# Patient Record
Sex: MALE | Race: WHITE | NOT HISPANIC OR LATINO | Employment: FULL TIME | URBAN - METROPOLITAN AREA
[De-identification: names, ages, dates, MRNs, and addresses within clinical notes are randomized per-mention and may not be internally consistent; named-entity substitution may affect disease eponyms.]

---

## 2019-10-07 ENCOUNTER — TELEPHONE (OUTPATIENT)
Dept: PULMONOLOGY | Facility: CLINIC | Age: 34
End: 2019-10-07

## 2019-10-07 DIAGNOSIS — R05.9 COUGH: Primary | ICD-10-CM

## 2019-11-21 ENCOUNTER — TELEPHONE (OUTPATIENT)
Dept: PULMONOLOGY | Facility: CLINIC | Age: 34
End: 2019-11-21

## 2020-09-03 DIAGNOSIS — Z00.00 ROUTINE GENERAL MEDICAL EXAMINATION AT A HEALTH CARE FACILITY: Primary | ICD-10-CM

## 2020-09-17 ENCOUNTER — OFFICE VISIT (OUTPATIENT)
Dept: INTERNAL MEDICINE | Facility: CLINIC | Age: 35
End: 2020-09-17
Payer: COMMERCIAL

## 2020-09-17 ENCOUNTER — CLINICAL SUPPORT (OUTPATIENT)
Dept: INTERNAL MEDICINE | Facility: CLINIC | Age: 35
End: 2020-09-17
Payer: COMMERCIAL

## 2020-09-17 ENCOUNTER — HOSPITAL ENCOUNTER (OUTPATIENT)
Dept: RADIOLOGY | Facility: HOSPITAL | Age: 35
Discharge: HOME | End: 2020-09-17
Attending: FAMILY MEDICINE

## 2020-09-17 ENCOUNTER — HOSPITAL ENCOUNTER (OUTPATIENT)
Dept: CARDIOLOGY | Facility: HOSPITAL | Age: 35
Discharge: HOME | End: 2020-09-17
Attending: FAMILY MEDICINE

## 2020-09-17 ENCOUNTER — CLINICAL SUPPORT (OUTPATIENT)
Dept: AUDIOLOGY | Facility: CLINIC | Age: 35
End: 2020-09-17

## 2020-09-17 VITALS
SYSTOLIC BLOOD PRESSURE: 108 MMHG | RESPIRATION RATE: 16 BRPM | HEART RATE: 61 BPM | DIASTOLIC BLOOD PRESSURE: 66 MMHG | HEIGHT: 70 IN | BODY MASS INDEX: 21.14 KG/M2 | WEIGHT: 147.69 LBS

## 2020-09-17 VITALS
HEART RATE: 61 BPM | SYSTOLIC BLOOD PRESSURE: 108 MMHG | DIASTOLIC BLOOD PRESSURE: 66 MMHG | WEIGHT: 147.69 LBS | HEIGHT: 70 IN | BODY MASS INDEX: 21.14 KG/M2 | TEMPERATURE: 95 F | OXYGEN SATURATION: 98 %

## 2020-09-17 DIAGNOSIS — Z01.12 HEARING CONSERVATION AND TREATMENT EXAM: Primary | ICD-10-CM

## 2020-09-17 DIAGNOSIS — Z71.3 DIETARY COUNSELING: ICD-10-CM

## 2020-09-17 DIAGNOSIS — Z23 NEED FOR INFLUENZA VACCINATION: ICD-10-CM

## 2020-09-17 DIAGNOSIS — Z00.00 ROUTINE GENERAL MEDICAL EXAMINATION AT A HEALTH CARE FACILITY: Primary | ICD-10-CM

## 2020-09-17 DIAGNOSIS — Z00.00 PREVENTATIVE HEALTH CARE: Primary | ICD-10-CM

## 2020-09-17 DIAGNOSIS — Z00.00 ROUTINE GENERAL MEDICAL EXAMINATION AT A HEALTH CARE FACILITY: ICD-10-CM

## 2020-09-17 LAB
ALBUMIN SERPL BCP-MCNC: 4.4 G/DL (ref 3.5–5.2)
ALP SERPL-CCNC: 68 U/L (ref 55–135)
ALT SERPL W/O P-5'-P-CCNC: 14 U/L (ref 10–44)
ANION GAP SERPL CALC-SCNC: 7 MMOL/L (ref 8–16)
AST SERPL-CCNC: 19 U/L (ref 10–40)
BILIRUB SERPL-MCNC: 0.7 MG/DL (ref 0.1–1)
BILIRUB UR QL STRIP: NEGATIVE
BUN SERPL-MCNC: 21 MG/DL (ref 6–20)
CALCIUM SERPL-MCNC: 9.3 MG/DL (ref 8.7–10.5)
CHLORIDE SERPL-SCNC: 106 MMOL/L (ref 95–110)
CHOLEST SERPL-MCNC: 158 MG/DL (ref 120–199)
CHOLEST/HDLC SERPL: 3.5 {RATIO} (ref 2–5)
CLARITY UR: CLEAR
CO2 SERPL-SCNC: 29 MMOL/L (ref 23–29)
COLOR UR: YELLOW
CREAT SERPL-MCNC: 1 MG/DL (ref 0.5–1.4)
ERYTHROCYTE [DISTWIDTH] IN BLOOD BY AUTOMATED COUNT: 11.2 % (ref 11.5–14.5)
EST. GFR  (AFRICAN AMERICAN): >60 ML/MIN/1.73 M^2
EST. GFR  (NON AFRICAN AMERICAN): >60 ML/MIN/1.73 M^2
ESTIMATED AVG GLUCOSE: 100 MG/DL (ref 68–131)
GLUCOSE SERPL-MCNC: 89 MG/DL (ref 70–110)
GLUCOSE UR QL STRIP: NEGATIVE
HBA1C MFR BLD HPLC: 5.1 % (ref 4–5.6)
HCT VFR BLD AUTO: 39.6 % (ref 40–54)
HDLC SERPL-MCNC: 45 MG/DL (ref 40–75)
HDLC SERPL: 28.5 % (ref 20–50)
HGB BLD-MCNC: 13.6 G/DL (ref 14–18)
HGB UR QL STRIP: NEGATIVE
KETONES UR QL STRIP: NEGATIVE
LDLC SERPL CALC-MCNC: 101.6 MG/DL (ref 63–159)
LEUKOCYTE ESTERASE UR QL STRIP: NEGATIVE
MCH RBC QN AUTO: 31.3 PG (ref 27–31)
MCHC RBC AUTO-ENTMCNC: 34.3 G/DL (ref 32–36)
MCV RBC AUTO: 91 FL (ref 82–98)
NITRITE UR QL STRIP: NEGATIVE
NONHDLC SERPL-MCNC: 113 MG/DL
PH UR STRIP: 7 [PH] (ref 5–8)
PLATELET # BLD AUTO: 192 K/UL (ref 150–350)
PMV BLD AUTO: 8.8 FL (ref 9.2–12.9)
POTASSIUM SERPL-SCNC: 4.5 MMOL/L (ref 3.5–5.1)
PROT SERPL-MCNC: 7.4 G/DL (ref 6–8.4)
PROT UR QL STRIP: NEGATIVE
RBC # BLD AUTO: 4.34 M/UL (ref 4.6–6.2)
SODIUM SERPL-SCNC: 142 MMOL/L (ref 136–145)
SP GR UR STRIP: 1.01 (ref 1–1.03)
TRIGL SERPL-MCNC: 57 MG/DL (ref 30–150)
URN SPEC COLLECT METH UR: NORMAL
WBC # BLD AUTO: 4.98 K/UL (ref 3.9–12.7)

## 2020-09-17 PROCEDURE — 93005 ELECTROCARDIOGRAM TRACING: CPT

## 2020-09-17 PROCEDURE — 99999 PR PBB SHADOW E&M-EST. PATIENT-LVL III: ICD-10-PCS | Mod: PBBFAC,,, | Performed by: FAMILY MEDICINE

## 2020-09-17 PROCEDURE — 80053 COMPREHEN METABOLIC PANEL: CPT

## 2020-09-17 PROCEDURE — 83036 HEMOGLOBIN GLYCOSYLATED A1C: CPT

## 2020-09-17 PROCEDURE — 80061 LIPID PANEL: CPT

## 2020-09-17 PROCEDURE — 90471 FLU VACCINE (QUAD) GREATER THAN OR EQUAL TO 3YO PRESERVATIVE FREE IM: ICD-10-PCS | Mod: S$GLB,,, | Performed by: FAMILY MEDICINE

## 2020-09-17 PROCEDURE — 90686 FLU VACCINE (QUAD) GREATER THAN OR EQUAL TO 3YO PRESERVATIVE FREE IM: ICD-10-PCS | Mod: S$GLB,,, | Performed by: FAMILY MEDICINE

## 2020-09-17 PROCEDURE — 90471 IMMUNIZATION ADMIN: CPT | Mod: S$GLB,,, | Performed by: FAMILY MEDICINE

## 2020-09-17 PROCEDURE — 71046 X-RAY EXAM CHEST 2 VIEWS: CPT | Mod: TC

## 2020-09-17 PROCEDURE — 86703 HIV-1/HIV-2 1 RESULT ANTBDY: CPT

## 2020-09-17 PROCEDURE — 92552 PURE TONE AUDIOMETRY AIR: CPT | Mod: S$GLB,,, | Performed by: AUDIOLOGIST

## 2020-09-17 PROCEDURE — 90686 IIV4 VACC NO PRSV 0.5 ML IM: CPT | Mod: S$GLB,,, | Performed by: FAMILY MEDICINE

## 2020-09-17 PROCEDURE — 99385 PREV VISIT NEW AGE 18-39: CPT | Mod: 25,S$GLB,, | Performed by: FAMILY MEDICINE

## 2020-09-17 PROCEDURE — 99999 PR PBB SHADOW E&M-EST. PATIENT-LVL III: CPT | Mod: PBBFAC,,, | Performed by: FAMILY MEDICINE

## 2020-09-17 PROCEDURE — 97802 PR MED NUTR THER, 1ST, INDIV, EA 15 MIN: ICD-10-PCS | Mod: S$GLB,,, | Performed by: INTERNAL MEDICINE

## 2020-09-17 PROCEDURE — 71046 XR CHEST PA AND LATERAL: ICD-10-PCS | Mod: 26,,, | Performed by: RADIOLOGY

## 2020-09-17 PROCEDURE — 83655 ASSAY OF LEAD: CPT

## 2020-09-17 PROCEDURE — 85027 COMPLETE CBC AUTOMATED: CPT

## 2020-09-17 PROCEDURE — 97802 MEDICAL NUTRITION INDIV IN: CPT | Mod: S$GLB,,, | Performed by: INTERNAL MEDICINE

## 2020-09-17 PROCEDURE — 93010 EKG 12-LEAD: ICD-10-PCS | Mod: ,,, | Performed by: INTERNAL MEDICINE

## 2020-09-17 PROCEDURE — 81003 URINALYSIS AUTO W/O SCOPE: CPT

## 2020-09-17 PROCEDURE — 99385 PR PREVENTIVE VISIT,NEW,18-39: ICD-10-PCS | Mod: 25,S$GLB,, | Performed by: FAMILY MEDICINE

## 2020-09-17 PROCEDURE — 92552 PR PURE TONE AUDIOMETRY, AIR: ICD-10-PCS | Mod: S$GLB,,, | Performed by: AUDIOLOGIST

## 2020-09-17 PROCEDURE — 71046 X-RAY EXAM CHEST 2 VIEWS: CPT | Mod: 26,,, | Performed by: RADIOLOGY

## 2020-09-17 PROCEDURE — 93010 ELECTROCARDIOGRAM REPORT: CPT | Mod: ,,, | Performed by: INTERNAL MEDICINE

## 2020-09-17 RX ORDER — HYDROGEN PEROXIDE 3 %
40 SOLUTION, NON-ORAL MISCELLANEOUS
COMMUNITY
End: 2020-09-17

## 2020-09-17 NOTE — PROGRESS NOTES
"Nutrition Assessment  Client name:  Fco Millan  :  1985  Age:  35 y.o.  Gender:  male    Client states:  Very pleasant employee from [employer] here for his first annual wellness exam with [employer]. Today is his first appointment with a dietitian. Patient reports being on the thinner side his whole life. Patient wonders if he is considered a healthy weight or should he try to gain more. Attempts at weight gain in the past has led to patient gaining mainly belly fat and reports having a "soft" belly which he does not like. Patient asked question in regards to if he is consuming enough food on a daily basis. Does not keep a food diary to track his daily caloric intake, but estimates to consume approximately 400-500 calories per meal time and less for each snack. Told by physician he is slightly anemic and was recommended to take a daily iron supplement, but never did. Food and exercise history provided below.                                   Anthropometrics  Height:  5' 10"     Weight:  147 lbs  BMI:  21.19  % Body Fat:  n/a    Clinical Signs/Symptoms  N/V/D:  none  Appetite (Good, Fair, or Poor):  good      No past medical history on file.    Past Surgical History:   Procedure Laterality Date    APPENDECTOMY         Medications    currently has no medications in their medication list.    Vitamins, Minerals, and/or Supplements:  probiotic     Food/Medication Interactions:  Reviewed     Food Allergies or Intolerances:  NKFA     Social History    Marital status:    Employment:  [employer]    Social History     Tobacco Use    Smoking status: Never Smoker   Substance Use Topics    Alcohol use: No     Alcohol/week: 0.0 standard drinks        Lab Reports   Total Cholesterol:  158    Triglycerides:  57  HDL:  45  LDL:  101.6   Glucose:  89  HbA1c:  pending  BP:  108/66     Food History  Breakfast:  Eggs + oatmeal  Mid-morning Snack:  (second breakfast at station) eggs + grits + " sausage  Specific for other meals not provided. Some foods patient enjoys are: wheat bread, 4-6oz steaks, pizza, chicken. Typically eats 5 times daily (meals and snacks).  *Fluid intake:  Water, 1-2 coffee (black), Ensure, small can of Coke Zero    Exercise History:  Run/walk/bike 3-4x/week for 20 minute sessions, little weight lifting    Cultural/Spiritual/Personal Preferences:  None noted    Support System:  spouse    State of Change:  contemplation    Barriers to Change:  none    Diagnosis    Food- and nutrition-knowledge deficit related to lack of prior nutrition education as evidenced by patient questioning healthy weight and appropriate daily caloric intake.    Intervention    RMR (Method:  [place] [employer]):  1607 kcal  Activity Factor:  1.3  RAÚL:  2089 calories    Goals:  1.  Keep a food diary for 3 days to get a general idea of daily caloric intake.  2.  Aim to consume approximately 2100 calories daily.  3.  Follow My Plate Method guidelines for balanced meal options.    Nutrition Education  Lipid panel and glucose labs were available at time of nutrition consultation; labs were already reviewed with patient by physician. Due to patient's anemia, encouraged patient to take iron supplement as recommended by physician. Made patient aware of some high iron foods to include in his daily diet. Provided patient daily caloric needs of approximately 2100 calories for weight maintenance given there will be no change in exercise routine. If patient is currently not meeting daily needs, gave recommendations of calorie dense foods he can begin to include in his daily diet. Reviewed My Plate Method. Provided contact information.    Patient verbalized understanding of nutrition education and recommendations received.    Handouts Provided  Meal Planning Guide  Restaurant Guide  Eat Fit Shopping List  Eat Fit Amanda  Fast Food Guide  My Plate Method  Healthy Snack List    Monitoring/Evaluation    Monitor the  following:  Weight  BMI  Caloric intake  Labs:  Lipid Panel, Glucose, HgbA1c    Follow Up Plan:  Communication with referring healthcare provider is unnecessary at this time as patient presented as part of annual wellness exam.  However, will follow up with patient in 1-2 years.

## 2020-09-17 NOTE — PROGRESS NOTES
Executive Health Screening    Fco Millan was seen 09/17/2020 for an executive health hearing screen.  Results revealed normal hearing sensitivity 250-8000 Hz, bilaterally, with the exception of a mild hearing loss at 3kHz, As.  Otoscopy was within normal limits, bilaterally.    Recommendations:  1. Wear Hearing Protective Devices around loud noises

## 2020-09-17 NOTE — PROGRESS NOTES
Dear Fco,    Thank you for allowing me to care for you and perform your Executive Health exam on 09/17/2020.    This letter and the accompanying report will serve as a summary of the medical history you provided, your physical exam findings, and your test results.    Thanks for letting me care for you, and thanks for trusting [facility] with your healthcare needs.    I look forward to the next time I can serve you. Until then, take care, and be well.    Warmest regards,     OLGA Zamudio MD    EXECUTIVE HEALTH ENCOUNTER      REASON FOR VISIT  EXECUTIVE HEALTH    HEALTH MAINTENANCE INTERVENTIONS - UP TO DATE  Health Maintenance Topics with due status: Not Due       Topic Last Completion Date    TETANUS VACCINE 01/01/2017    Lipid Panel 09/17/2020       HEALTH MAINTENANCE INTERVENTIONS - DUE OR DUE SOON  Health Maintenance Due   Topic Date Due    Hepatitis C Screening  1985       PCP (Primary Care Provider)  Fco is requesting that I take over as his primary care provider.    ISSUES  The following issues were identified and addressed.  1. Preventative health care        Problem List Items Addressed This Visit     None      Visit Diagnoses     Preventative health care    -  Primary          DATA SUMMARY    LABORATORY:    CBC: The complete blood count (CBC) checks for anemia and measures the red blood cells, white blood cells, and platelets in your blood.  o YOUR RESULTS: Your CBC shows a very mild anemia. Although not completely normal, your result does not necessarily mean something bad. I recommend trying to get more iron in your diet. Foods high in iron include breakfast cereals enriched with iron, cooked beans, tofu, pumpkin seeds, sesame seeds, or squash seeds, lima beans, red kidney beans, chickpeas, dried apricots, baked potato, and wheat germ. Animal products can also be a good source of iron, but these are also high in fat and can be unhealthy for your heart. Animal products high in iron include  beef or chicken liver, clams, mollusks, or mussels, oysters, cooked beef, and sardines (canned in oil).     CMP: The comprehensive metabolic panel (CMP) checks kidney function, liver function, sodium, potassium, glucose (sugar) and other aspects of your metabolism.  o YOUR RESULTS: NORMAL or at least ACCEPTABLE. No further evaluation or treatment required at this time.     Lipid Panel: The lipid panel measures the levels of different types of fatty substances in your blood (cholesterol and triglycerides) that can contribute to plaque buildup in your arteries (atherosclerosis).  o YOUR RESULTS: NORMAL. No further evaluation or treatment required.     A1c: The hemoglobin A1c (A1c) is a test that evaluates and screens for diabetes.  o YOUR RESULTS: NORMAL. No further evaluation or treatment required.     HIV: This test screens for infection with HIV (Human Immunodeficiency Virus).  o YOUR RESULTS: NORMAL. No further evaluation or treatment required.     Urinalysis: A test that examines urine. The test can be chemical, microscopic, or both.  o YOUR RESULTS: NORMAL. No further evaluation or treatment required.     Serum Lead Level:  A measure of the amount of lead (a toxic heavy metal) in your blood.  o YOUR RESULTS: NORMAL. No further evaluation or treatment required.      CARDIOPULMONARY:   Electrocardiogram: The electrocardiogram (also referred to as ECG or EKG) is a non-invasive test that measures the electrical activity of the heart's conduction system.  o YOUR RESULTS: NORMAL. No further evaluation or treatment required.     Chest X-ray: This test produces images that allow simple examination of the heart and lungs.  o YOUR RESULTS: NORMAL. No further evaluation or treatment required.      OTHER:     Audiometry: Audiometry is a test that measures a person's ability to hear at various sound frequencies.  o YOUR RESULTS: Results revealed normal hearing sensitivity 250-8000 Hz, bilaterally, with the exception  "of a mild hearing loss at 3kHz, As.  Otoscopy was within normal limits, bilaterally. Recommendations: Wear Hearing Protective Devices around loud noises.      PHYSICAL EXAM  His body mass index is 21.19 kg/m². His  height is 5' 10" (1.778 m) and weight is 67 kg (147 lb 11.3 oz). His tympanic temperature is 94.8 °F (34.9 °C) (abnormal). His blood pressure is 108/66 and his pulse is 61. His oxygen saturation is 98%.    Physical Exam  Vitals signs reviewed.   Constitutional:       General: He is not in acute distress.     Appearance: Normal appearance.   Eyes:      General: No scleral icterus.     Conjunctiva/sclera: Conjunctivae normal.   Neck:      Musculoskeletal: No muscular tenderness.      Vascular: No carotid bruit.   Cardiovascular:      Rate and Rhythm: Normal rate and regular rhythm.      Heart sounds: Normal heart sounds.   Pulmonary:      Effort: Pulmonary effort is normal. No respiratory distress.      Breath sounds: Normal breath sounds. No wheezing or rhonchi.   Abdominal:      General: Bowel sounds are normal. There is no distension.      Palpations: Abdomen is soft. There is no mass.      Tenderness: There is no abdominal tenderness.   Lymphadenopathy:      Cervical: No cervical adenopathy.   Skin:     General: Skin is warm and dry.      Coloration: Skin is not jaundiced.   Neurological:      General: No focal deficit present.      Mental Status: He is alert and oriented to person, place, and time.   Psychiatric:         Mood and Affect: Mood normal.         Behavior: Behavior normal.         Judgment: Judgment normal.         MEDICATIONS  He currently has no medications in their medication list.  Medications Discontinued During This Encounter   Medication Reason    cetirizine (ZYRTEC) 10 MG tablet Patient no longer taking    esomeprazole (NEXIUM) 20 MG capsule Patient no longer taking    fluticasone (FLONASE) 50 mcg/actuation nasal spray Patient no longer taking          REVIEW OF SYSTEMS  Review " "of Systems   Constitutional: Negative for chills and fever.   HENT: Negative for ear pain and trouble swallowing.    Eyes: Negative for pain and visual disturbance.   Respiratory: Negative for chest tightness and shortness of breath.    Cardiovascular: Negative for chest pain and palpitations.   Gastrointestinal: Negative for abdominal pain and blood in stool.   Endocrine: Negative for polydipsia and polyuria.   Genitourinary: Negative.  Negative for difficulty urinating, dysuria and hematuria.   Musculoskeletal: Negative for gait problem and joint swelling.   Integumentary:  Negative for rash and wound.   Neurological: Negative for vertigo and syncope.   Hematological: Negative.    Psychiatric/Behavioral: Negative for agitation and confusion.       FOLLOW-UP  He is to follow up with me for any "Health Maintenance Interventions - Due Or Due Soon" listed above, for any unresolved issues addressed during this encounter, and for any new complaints or concerns.    PAST MEDICAL HISTORY  He has no past medical history on file.    SURGICAL HISTORY  He has a past surgical history that includes Appendectomy.    FAMILY HISTORY  His family history includes No Known Problems in his father and mother.     ALLERGIES  He has No Known Allergies.    SOCIAL HISTORY   TOBACCO USE HISTORY: He reports that he has never smoked. He does not have any smokeless tobacco history on file.   ALCOHOL USE HISTORY:  He reports no history of alcohol use.    Documentation entered by me for this encounter may have been done in part using speech-recognition technology. Although I have made an effort to ensure accuracy, "sound like" errors may exist and should be interpreted in context. -OLGA Zamudio MD.   "

## 2020-09-18 ENCOUNTER — PATIENT MESSAGE (OUTPATIENT)
Dept: INTERNAL MEDICINE | Facility: CLINIC | Age: 35
End: 2020-09-18

## 2020-09-18 LAB
HIV 1+2 AB+HIV1 P24 AG SERPL QL IA: NEGATIVE
LEAD BLD-MCNC: <1 MCG/DL
STATE OF RESIDENCE: NORMAL

## 2020-09-19 ENCOUNTER — PATIENT MESSAGE (OUTPATIENT)
Dept: INTERNAL MEDICINE | Facility: CLINIC | Age: 35
End: 2020-09-19

## 2021-01-05 ENCOUNTER — PATIENT MESSAGE (OUTPATIENT)
Dept: INTERNAL MEDICINE | Facility: CLINIC | Age: 36
End: 2021-01-05

## 2021-01-22 ENCOUNTER — CLINICAL SUPPORT (OUTPATIENT)
Dept: OTHER | Facility: CLINIC | Age: 36
End: 2021-01-22

## 2021-01-22 DIAGNOSIS — Z00.8 ENCOUNTER FOR OTHER GENERAL EXAMINATION: ICD-10-CM

## 2021-01-28 ENCOUNTER — PATIENT MESSAGE (OUTPATIENT)
Dept: INTERNAL MEDICINE | Facility: CLINIC | Age: 36
End: 2021-01-28

## 2021-02-22 ENCOUNTER — PATIENT MESSAGE (OUTPATIENT)
Dept: INTERNAL MEDICINE | Facility: CLINIC | Age: 36
End: 2021-02-22

## 2021-03-11 ENCOUNTER — HOSPITAL ENCOUNTER (OUTPATIENT)
Dept: RADIOLOGY | Facility: HOSPITAL | Age: 36
Discharge: HOME | End: 2021-03-11
Attending: FAMILY MEDICINE
Payer: OTHER GOVERNMENT

## 2021-03-11 ENCOUNTER — OFFICE VISIT (OUTPATIENT)
Dept: INTERNAL MEDICINE | Facility: CLINIC | Age: 36
End: 2021-03-11
Payer: OTHER GOVERNMENT

## 2021-03-11 VITALS
WEIGHT: 151.25 LBS | SYSTOLIC BLOOD PRESSURE: 120 MMHG | OXYGEN SATURATION: 98 % | HEART RATE: 87 BPM | DIASTOLIC BLOOD PRESSURE: 86 MMHG | TEMPERATURE: 98 F | BODY MASS INDEX: 21.7 KG/M2

## 2021-03-11 DIAGNOSIS — Z83.79 FAMILY HISTORY OF BARRETT'S ESOPHAGUS: ICD-10-CM

## 2021-03-11 DIAGNOSIS — M79.672 CHRONIC PAIN IN LEFT FOOT: ICD-10-CM

## 2021-03-11 DIAGNOSIS — G89.29 CHRONIC PAIN IN LEFT FOOT: ICD-10-CM

## 2021-03-11 DIAGNOSIS — R39.11 URINARY HESITANCY: ICD-10-CM

## 2021-03-11 DIAGNOSIS — K21.9 GASTROESOPHAGEAL REFLUX DISEASE WITHOUT ESOPHAGITIS: Primary | ICD-10-CM

## 2021-03-11 DIAGNOSIS — M75.102 ROTATOR CUFF SYNDROME OF LEFT SHOULDER: ICD-10-CM

## 2021-03-11 DIAGNOSIS — I49.3 FREQUENT PVCS: ICD-10-CM

## 2021-03-11 PROBLEM — K21.00 GASTROESOPHAGEAL REFLUX DISEASE WITH ESOPHAGITIS WITHOUT HEMORRHAGE: Status: ACTIVE | Noted: 2021-03-11

## 2021-03-11 PROCEDURE — 99214 PR OFFICE/OUTPT VISIT, EST, LEVL IV, 30-39 MIN: ICD-10-PCS | Mod: S$PBB,,, | Performed by: FAMILY MEDICINE

## 2021-03-11 PROCEDURE — 73630 X-RAY EXAM OF FOOT: CPT | Mod: TC,LT

## 2021-03-11 PROCEDURE — 99214 OFFICE O/P EST MOD 30 MIN: CPT | Mod: S$PBB,,, | Performed by: FAMILY MEDICINE

## 2021-03-11 PROCEDURE — 99999 PR PBB SHADOW E&M-EST. PATIENT-LVL V: CPT | Mod: PBBFAC,,, | Performed by: FAMILY MEDICINE

## 2021-03-11 PROCEDURE — 73630 XR FOOT COMPLETE 3 VIEW LEFT: ICD-10-PCS | Mod: 26,LT,, | Performed by: RADIOLOGY

## 2021-03-11 PROCEDURE — 99215 OFFICE O/P EST HI 40 MIN: CPT | Mod: PBBFAC,25 | Performed by: FAMILY MEDICINE

## 2021-03-11 PROCEDURE — 73630 X-RAY EXAM OF FOOT: CPT | Mod: 26,LT,, | Performed by: RADIOLOGY

## 2021-03-11 PROCEDURE — 99999 PR PBB SHADOW E&M-EST. PATIENT-LVL V: ICD-10-PCS | Mod: PBBFAC,,, | Performed by: FAMILY MEDICINE

## 2021-03-14 ENCOUNTER — PATIENT MESSAGE (OUTPATIENT)
Dept: INTERNAL MEDICINE | Facility: CLINIC | Age: 36
End: 2021-03-14

## 2021-03-14 DIAGNOSIS — I49.3 FREQUENT PVCS: Primary | ICD-10-CM

## 2021-03-15 ENCOUNTER — TELEPHONE (OUTPATIENT)
Dept: ENDOSCOPY | Facility: HOSPITAL | Age: 36
End: 2021-03-15

## 2021-03-22 ENCOUNTER — CLINICAL SUPPORT (OUTPATIENT)
Dept: REHABILITATION | Facility: HOSPITAL | Age: 36
End: 2021-03-22
Attending: FAMILY MEDICINE
Payer: OTHER GOVERNMENT

## 2021-03-22 DIAGNOSIS — G89.29 CHRONIC LEFT SHOULDER PAIN: ICD-10-CM

## 2021-03-22 DIAGNOSIS — M75.102 ROTATOR CUFF SYNDROME OF LEFT SHOULDER: ICD-10-CM

## 2021-03-22 DIAGNOSIS — M25.512 CHRONIC LEFT SHOULDER PAIN: ICD-10-CM

## 2021-03-22 DIAGNOSIS — R53.1 GENERALIZED WEAKNESS: ICD-10-CM

## 2021-03-22 DIAGNOSIS — M25.60 DECREASED RANGE OF MOTION: ICD-10-CM

## 2021-03-22 PROCEDURE — 97161 PT EVAL LOW COMPLEX 20 MIN: CPT | Mod: PN

## 2021-03-22 PROCEDURE — 97110 THERAPEUTIC EXERCISES: CPT | Mod: PN

## 2021-03-24 ENCOUNTER — OFFICE VISIT (OUTPATIENT)
Dept: PODIATRY | Facility: CLINIC | Age: 36
End: 2021-03-24
Payer: OTHER GOVERNMENT

## 2021-03-24 VITALS
WEIGHT: 151 LBS | HEIGHT: 70 IN | HEART RATE: 66 BPM | SYSTOLIC BLOOD PRESSURE: 100 MMHG | DIASTOLIC BLOOD PRESSURE: 66 MMHG | BODY MASS INDEX: 21.62 KG/M2

## 2021-03-24 DIAGNOSIS — M12.572 TRAUMATIC ARTHRITIS OF FOOT, LEFT: Primary | ICD-10-CM

## 2021-03-24 PROCEDURE — 99999 PR PBB SHADOW E&M-EST. PATIENT-LVL III: ICD-10-PCS | Mod: PBBFAC,,, | Performed by: PODIATRIST

## 2021-03-24 PROCEDURE — 99243 PR OFFICE CONSULTATION,LEVEL III: ICD-10-PCS | Mod: S$PBB,,, | Performed by: PODIATRIST

## 2021-03-24 PROCEDURE — 99999 PR PBB SHADOW E&M-EST. PATIENT-LVL III: CPT | Mod: PBBFAC,,, | Performed by: PODIATRIST

## 2021-03-24 PROCEDURE — 99213 OFFICE O/P EST LOW 20 MIN: CPT | Mod: PBBFAC | Performed by: PODIATRIST

## 2021-03-24 PROCEDURE — 99243 OFF/OP CNSLTJ NEW/EST LOW 30: CPT | Mod: S$PBB,,, | Performed by: PODIATRIST

## 2021-03-25 ENCOUNTER — OFFICE VISIT (OUTPATIENT)
Dept: UROLOGY | Facility: CLINIC | Age: 36
End: 2021-03-25
Payer: OTHER GOVERNMENT

## 2021-03-25 VITALS
HEIGHT: 70 IN | DIASTOLIC BLOOD PRESSURE: 86 MMHG | WEIGHT: 151 LBS | HEART RATE: 80 BPM | SYSTOLIC BLOOD PRESSURE: 118 MMHG | BODY MASS INDEX: 21.62 KG/M2

## 2021-03-25 DIAGNOSIS — R39.11 URINARY HESITANCY: ICD-10-CM

## 2021-03-25 PROCEDURE — 99203 PR OFFICE/OUTPT VISIT, NEW, LEVL III, 30-44 MIN: ICD-10-PCS | Mod: S$PBB,,, | Performed by: UROLOGY

## 2021-03-25 PROCEDURE — 99999 PR PBB SHADOW E&M-EST. PATIENT-LVL III: ICD-10-PCS | Mod: PBBFAC,,, | Performed by: UROLOGY

## 2021-03-25 PROCEDURE — 99999 PR PBB SHADOW E&M-EST. PATIENT-LVL III: CPT | Mod: PBBFAC,,, | Performed by: UROLOGY

## 2021-03-25 PROCEDURE — 99213 OFFICE O/P EST LOW 20 MIN: CPT | Mod: PBBFAC | Performed by: UROLOGY

## 2021-03-25 PROCEDURE — 99203 OFFICE O/P NEW LOW 30 MIN: CPT | Mod: S$PBB,,, | Performed by: UROLOGY

## 2021-03-29 ENCOUNTER — CLINICAL SUPPORT (OUTPATIENT)
Dept: REHABILITATION | Facility: HOSPITAL | Age: 36
End: 2021-03-29
Attending: FAMILY MEDICINE
Payer: OTHER GOVERNMENT

## 2021-03-29 DIAGNOSIS — R53.1 GENERALIZED WEAKNESS: ICD-10-CM

## 2021-03-29 DIAGNOSIS — G89.29 CHRONIC LEFT SHOULDER PAIN: ICD-10-CM

## 2021-03-29 DIAGNOSIS — M25.512 CHRONIC LEFT SHOULDER PAIN: ICD-10-CM

## 2021-03-29 DIAGNOSIS — M25.60 DECREASED RANGE OF MOTION: ICD-10-CM

## 2021-03-29 PROCEDURE — 97140 MANUAL THERAPY 1/> REGIONS: CPT | Mod: PN

## 2021-03-29 PROCEDURE — 97110 THERAPEUTIC EXERCISES: CPT | Mod: PN

## 2021-04-01 ENCOUNTER — CLINICAL SUPPORT (OUTPATIENT)
Dept: REHABILITATION | Facility: HOSPITAL | Age: 36
End: 2021-04-01
Attending: FAMILY MEDICINE
Payer: OTHER GOVERNMENT

## 2021-04-01 DIAGNOSIS — G89.29 CHRONIC LEFT SHOULDER PAIN: ICD-10-CM

## 2021-04-01 DIAGNOSIS — M25.60 DECREASED RANGE OF MOTION: ICD-10-CM

## 2021-04-01 DIAGNOSIS — R53.1 GENERALIZED WEAKNESS: ICD-10-CM

## 2021-04-01 DIAGNOSIS — M25.512 CHRONIC LEFT SHOULDER PAIN: ICD-10-CM

## 2021-04-01 PROCEDURE — 97140 MANUAL THERAPY 1/> REGIONS: CPT | Mod: PN,CQ

## 2021-04-01 PROCEDURE — 97110 THERAPEUTIC EXERCISES: CPT | Mod: PN,CQ

## 2021-04-05 ENCOUNTER — CLINICAL SUPPORT (OUTPATIENT)
Dept: REHABILITATION | Facility: HOSPITAL | Age: 36
End: 2021-04-05
Attending: FAMILY MEDICINE
Payer: OTHER GOVERNMENT

## 2021-04-05 DIAGNOSIS — M25.512 CHRONIC LEFT SHOULDER PAIN: ICD-10-CM

## 2021-04-05 DIAGNOSIS — G89.29 CHRONIC LEFT SHOULDER PAIN: ICD-10-CM

## 2021-04-05 DIAGNOSIS — R53.1 GENERALIZED WEAKNESS: ICD-10-CM

## 2021-04-05 DIAGNOSIS — M25.60 DECREASED RANGE OF MOTION: ICD-10-CM

## 2021-04-05 PROCEDURE — 97110 THERAPEUTIC EXERCISES: CPT | Mod: PN,CQ

## 2021-04-05 PROCEDURE — 97140 MANUAL THERAPY 1/> REGIONS: CPT | Mod: PN,CQ

## 2021-04-07 ENCOUNTER — CLINICAL SUPPORT (OUTPATIENT)
Dept: REHABILITATION | Facility: HOSPITAL | Age: 36
End: 2021-04-07
Attending: FAMILY MEDICINE
Payer: OTHER GOVERNMENT

## 2021-04-07 DIAGNOSIS — M25.60 DECREASED RANGE OF MOTION: ICD-10-CM

## 2021-04-07 DIAGNOSIS — R53.1 GENERALIZED WEAKNESS: ICD-10-CM

## 2021-04-07 DIAGNOSIS — M25.512 CHRONIC LEFT SHOULDER PAIN: ICD-10-CM

## 2021-04-07 DIAGNOSIS — G89.29 CHRONIC LEFT SHOULDER PAIN: ICD-10-CM

## 2021-04-07 PROCEDURE — 97140 MANUAL THERAPY 1/> REGIONS: CPT | Mod: PN,CQ

## 2021-04-07 PROCEDURE — 97110 THERAPEUTIC EXERCISES: CPT | Mod: PN,CQ

## 2021-04-12 ENCOUNTER — PATIENT MESSAGE (OUTPATIENT)
Dept: INTERNAL MEDICINE | Facility: CLINIC | Age: 36
End: 2021-04-12

## 2021-04-12 ENCOUNTER — CLINICAL SUPPORT (OUTPATIENT)
Dept: REHABILITATION | Facility: HOSPITAL | Age: 36
End: 2021-04-12
Attending: FAMILY MEDICINE
Payer: OTHER GOVERNMENT

## 2021-04-12 DIAGNOSIS — R53.1 GENERALIZED WEAKNESS: ICD-10-CM

## 2021-04-12 DIAGNOSIS — M25.60 DECREASED RANGE OF MOTION: ICD-10-CM

## 2021-04-12 DIAGNOSIS — G89.29 CHRONIC LEFT SHOULDER PAIN: ICD-10-CM

## 2021-04-12 DIAGNOSIS — M25.511 ACUTE PAIN OF RIGHT SHOULDER: ICD-10-CM

## 2021-04-12 DIAGNOSIS — M75.102 ROTATOR CUFF SYNDROME OF LEFT SHOULDER: Primary | ICD-10-CM

## 2021-04-12 DIAGNOSIS — M25.512 CHRONIC LEFT SHOULDER PAIN: ICD-10-CM

## 2021-04-12 PROCEDURE — 97140 MANUAL THERAPY 1/> REGIONS: CPT | Mod: PN

## 2021-04-12 PROCEDURE — 97110 THERAPEUTIC EXERCISES: CPT | Mod: PN

## 2021-04-13 ENCOUNTER — TELEPHONE (OUTPATIENT)
Dept: INTERNAL MEDICINE | Facility: CLINIC | Age: 36
End: 2021-04-13

## 2021-04-13 NOTE — TELEPHONE ENCOUNTER
Fco Bobby.    I received a status report from your physical therapist, Becky Haynes PT. I understand that your shoulder it not improving as desired.    I have entered a referral order for you to be treated by one of our excellent orthopedic surgeons (a specialist that diagnoses and treats diseases, injuries, and defects of bones and joints). Someone from [facility] will be contacting you soon to help you schedule that appointment and a shoulder x-ray to be done before the orthopedic appointment.    Please take the time soon to schedule that appointment. You can schedule the appointment from your [facility] account or from the [facility]athish on your smartphone or by calling our appointment desk at [phone]. If you have any difficulty, send my staff a message, and they will be glad to help.     Thanks for letting me care for you, and thanks for trusting [facility] with your healthcare needs.    Take care and be well.    Sincerely,    OLGA Zamudio MD

## 2021-04-13 NOTE — TELEPHONE ENCOUNTER
----- Message from Becky Haynes PT sent at 4/12/2021 12:51 PM CDT -----  Hi Dr. Zamudio, I have been seeing this patient for L shoulder pain. He has not really been able to progress very well and I am beginning to be a little concerned for RTC involvement. Initially on his evaluation he was negative for testing for the RTC, however, I am beginning to think that he may have been compensating. The pt is concerned because he is supposed to be leaving for training in a few months and his shoulder is not getting any better. I didn't know if you felt that at this time it might be time to try an ortho referral or to try to obtain a MRI for further imaging of the shoulder. Thank you again for the referral and I appreciate the interdisciplinary team work.     Have a great day, Becky Haynes, PT, DPT

## 2021-04-14 ENCOUNTER — CLINICAL SUPPORT (OUTPATIENT)
Dept: REHABILITATION | Facility: HOSPITAL | Age: 36
End: 2021-04-14
Attending: FAMILY MEDICINE
Payer: OTHER GOVERNMENT

## 2021-04-14 DIAGNOSIS — M25.60 DECREASED RANGE OF MOTION: ICD-10-CM

## 2021-04-14 DIAGNOSIS — G89.29 CHRONIC LEFT SHOULDER PAIN: ICD-10-CM

## 2021-04-14 DIAGNOSIS — R53.1 GENERALIZED WEAKNESS: ICD-10-CM

## 2021-04-14 DIAGNOSIS — M25.512 CHRONIC LEFT SHOULDER PAIN: ICD-10-CM

## 2021-04-14 PROCEDURE — 97110 THERAPEUTIC EXERCISES: CPT | Mod: PN,CQ

## 2021-04-14 PROCEDURE — 97140 MANUAL THERAPY 1/> REGIONS: CPT | Mod: PN,CQ

## 2021-04-15 ENCOUNTER — PATIENT MESSAGE (OUTPATIENT)
Dept: INTERNAL MEDICINE | Facility: CLINIC | Age: 36
End: 2021-04-15

## 2021-04-15 ENCOUNTER — TELEPHONE (OUTPATIENT)
Dept: ORTHOPEDICS | Facility: CLINIC | Age: 36
End: 2021-04-15

## 2021-04-15 DIAGNOSIS — M25.512 BILATERAL SHOULDER PAIN, UNSPECIFIED CHRONICITY: Primary | ICD-10-CM

## 2021-04-15 DIAGNOSIS — M25.511 BILATERAL SHOULDER PAIN, UNSPECIFIED CHRONICITY: Primary | ICD-10-CM

## 2021-04-16 ENCOUNTER — HOSPITAL ENCOUNTER (OUTPATIENT)
Dept: RADIOLOGY | Facility: HOSPITAL | Age: 36
Discharge: HOME | End: 2021-04-16
Attending: FAMILY MEDICINE
Payer: OTHER GOVERNMENT

## 2021-04-16 ENCOUNTER — OFFICE VISIT (OUTPATIENT)
Dept: ORTHOPEDICS | Facility: CLINIC | Age: 36
End: 2021-04-16
Payer: OTHER GOVERNMENT

## 2021-04-16 VITALS
HEART RATE: 82 BPM | DIASTOLIC BLOOD PRESSURE: 79 MMHG | SYSTOLIC BLOOD PRESSURE: 123 MMHG | WEIGHT: 151 LBS | HEIGHT: 70 IN | BODY MASS INDEX: 21.62 KG/M2

## 2021-04-16 DIAGNOSIS — M25.511 BILATERAL SHOULDER PAIN, UNSPECIFIED CHRONICITY: ICD-10-CM

## 2021-04-16 DIAGNOSIS — M25.512 LEFT SHOULDER PAIN, UNSPECIFIED CHRONICITY: Primary | ICD-10-CM

## 2021-04-16 DIAGNOSIS — M75.41 IMPINGEMENT SYNDROME OF RIGHT SHOULDER: Primary | ICD-10-CM

## 2021-04-16 DIAGNOSIS — M25.512 BILATERAL SHOULDER PAIN, UNSPECIFIED CHRONICITY: ICD-10-CM

## 2021-04-16 DIAGNOSIS — M75.102 ROTATOR CUFF SYNDROME OF LEFT SHOULDER: ICD-10-CM

## 2021-04-16 PROCEDURE — 99999 PR PBB SHADOW E&M-EST. PATIENT-LVL III: ICD-10-PCS | Mod: PBBFAC,,, | Performed by: FAMILY MEDICINE

## 2021-04-16 PROCEDURE — 99204 OFFICE O/P NEW MOD 45 MIN: CPT | Mod: S$PBB,,, | Performed by: FAMILY MEDICINE

## 2021-04-16 PROCEDURE — 73030 X-RAY EXAM OF SHOULDER: CPT | Mod: TC,50

## 2021-04-16 PROCEDURE — 99204 PR OFFICE/OUTPT VISIT, NEW, LEVL IV, 45-59 MIN: ICD-10-PCS | Mod: S$PBB,,, | Performed by: FAMILY MEDICINE

## 2021-04-16 PROCEDURE — 73030 XR SHOULDER COMPLETE 2 OR MORE VIEWS BILATERAL: ICD-10-PCS | Mod: 26,50,, | Performed by: RADIOLOGY

## 2021-04-16 PROCEDURE — 99999 PR PBB SHADOW E&M-EST. PATIENT-LVL III: CPT | Mod: PBBFAC,,, | Performed by: FAMILY MEDICINE

## 2021-04-16 PROCEDURE — 73030 X-RAY EXAM OF SHOULDER: CPT | Mod: 26,50,, | Performed by: RADIOLOGY

## 2021-04-16 PROCEDURE — 99213 OFFICE O/P EST LOW 20 MIN: CPT | Mod: PBBFAC,25 | Performed by: FAMILY MEDICINE

## 2021-04-19 ENCOUNTER — CLINICAL SUPPORT (OUTPATIENT)
Dept: REHABILITATION | Facility: HOSPITAL | Age: 36
End: 2021-04-19
Attending: FAMILY MEDICINE
Payer: OTHER GOVERNMENT

## 2021-04-19 DIAGNOSIS — M25.60 DECREASED RANGE OF MOTION: ICD-10-CM

## 2021-04-19 DIAGNOSIS — R53.1 GENERALIZED WEAKNESS: ICD-10-CM

## 2021-04-19 DIAGNOSIS — G89.29 CHRONIC LEFT SHOULDER PAIN: ICD-10-CM

## 2021-04-19 DIAGNOSIS — M25.512 CHRONIC LEFT SHOULDER PAIN: ICD-10-CM

## 2021-04-19 PROCEDURE — 97110 THERAPEUTIC EXERCISES: CPT | Mod: PN

## 2021-04-19 PROCEDURE — 97140 MANUAL THERAPY 1/> REGIONS: CPT | Mod: PN

## 2021-04-21 ENCOUNTER — PATIENT MESSAGE (OUTPATIENT)
Dept: ORTHOPEDICS | Facility: CLINIC | Age: 36
End: 2021-04-21

## 2021-04-23 ENCOUNTER — CLINICAL SUPPORT (OUTPATIENT)
Dept: REHABILITATION | Facility: HOSPITAL | Age: 36
End: 2021-04-23
Attending: FAMILY MEDICINE
Payer: OTHER GOVERNMENT

## 2021-04-23 DIAGNOSIS — M25.512 CHRONIC LEFT SHOULDER PAIN: ICD-10-CM

## 2021-04-23 DIAGNOSIS — M25.60 DECREASED RANGE OF MOTION: ICD-10-CM

## 2021-04-23 DIAGNOSIS — G89.29 CHRONIC LEFT SHOULDER PAIN: ICD-10-CM

## 2021-04-23 DIAGNOSIS — R53.1 GENERALIZED WEAKNESS: ICD-10-CM

## 2021-04-23 PROCEDURE — 97140 MANUAL THERAPY 1/> REGIONS: CPT | Mod: PN,CQ

## 2021-04-23 PROCEDURE — 97110 THERAPEUTIC EXERCISES: CPT | Mod: PN,CQ

## 2021-04-28 ENCOUNTER — PATIENT MESSAGE (OUTPATIENT)
Dept: RESEARCH | Facility: HOSPITAL | Age: 36
End: 2021-04-28

## 2021-04-28 ENCOUNTER — CLINICAL SUPPORT (OUTPATIENT)
Dept: REHABILITATION | Facility: HOSPITAL | Age: 36
End: 2021-04-28
Attending: FAMILY MEDICINE
Payer: OTHER GOVERNMENT

## 2021-04-28 ENCOUNTER — HOSPITAL ENCOUNTER (OUTPATIENT)
Dept: RADIOLOGY | Facility: HOSPITAL | Age: 36
Discharge: HOME | End: 2021-04-28
Attending: FAMILY MEDICINE
Payer: OTHER GOVERNMENT

## 2021-04-28 DIAGNOSIS — M25.60 DECREASED RANGE OF MOTION: ICD-10-CM

## 2021-04-28 DIAGNOSIS — G89.29 CHRONIC LEFT SHOULDER PAIN: ICD-10-CM

## 2021-04-28 DIAGNOSIS — M25.512 CHRONIC LEFT SHOULDER PAIN: ICD-10-CM

## 2021-04-28 DIAGNOSIS — R53.1 GENERALIZED WEAKNESS: ICD-10-CM

## 2021-04-28 DIAGNOSIS — M25.512 LEFT SHOULDER PAIN, UNSPECIFIED CHRONICITY: ICD-10-CM

## 2021-04-28 PROCEDURE — 73221 MRI SHOULDER WITHOUT CONTRAST LEFT: ICD-10-PCS | Mod: 26,LT,, | Performed by: RADIOLOGY

## 2021-04-28 PROCEDURE — 97140 MANUAL THERAPY 1/> REGIONS: CPT | Mod: PN,CQ

## 2021-04-28 PROCEDURE — 73221 MRI JOINT UPR EXTREM W/O DYE: CPT | Mod: TC,PO,LT

## 2021-04-28 PROCEDURE — 97110 THERAPEUTIC EXERCISES: CPT | Mod: PN,CQ

## 2021-04-28 PROCEDURE — 73221 MRI JOINT UPR EXTREM W/O DYE: CPT | Mod: 26,LT,, | Performed by: RADIOLOGY

## 2021-05-20 ENCOUNTER — CLINICAL SUPPORT (OUTPATIENT)
Dept: REHABILITATION | Facility: HOSPITAL | Age: 36
End: 2021-05-20
Attending: FAMILY MEDICINE
Payer: OTHER GOVERNMENT

## 2021-05-20 DIAGNOSIS — G89.29 CHRONIC LEFT SHOULDER PAIN: ICD-10-CM

## 2021-05-20 DIAGNOSIS — M25.512 CHRONIC LEFT SHOULDER PAIN: ICD-10-CM

## 2021-05-20 DIAGNOSIS — M25.60 DECREASED RANGE OF MOTION: ICD-10-CM

## 2021-05-20 DIAGNOSIS — R53.1 GENERALIZED WEAKNESS: ICD-10-CM

## 2021-05-20 PROCEDURE — 97110 THERAPEUTIC EXERCISES: CPT | Mod: PN

## 2021-05-20 PROCEDURE — 97140 MANUAL THERAPY 1/> REGIONS: CPT | Mod: PN

## 2021-05-24 ENCOUNTER — OFFICE VISIT (OUTPATIENT)
Dept: ORTHOPEDICS | Facility: CLINIC | Age: 36
End: 2021-05-24
Payer: OTHER GOVERNMENT

## 2021-05-24 VITALS — WEIGHT: 150 LBS | HEIGHT: 70 IN | BODY MASS INDEX: 21.47 KG/M2

## 2021-05-24 DIAGNOSIS — M25.512 LEFT SHOULDER PAIN, UNSPECIFIED CHRONICITY: Primary | ICD-10-CM

## 2021-05-24 DIAGNOSIS — S43.432D LABRAL TEAR OF SHOULDER, LEFT, SUBSEQUENT ENCOUNTER: ICD-10-CM

## 2021-05-24 DIAGNOSIS — M25.612 DECREASED RANGE OF MOTION OF LEFT SHOULDER: ICD-10-CM

## 2021-05-24 PROCEDURE — 99999 PR PBB SHADOW E&M-EST. PATIENT-LVL III: CPT | Mod: PBBFAC,,, | Performed by: ORTHOPAEDIC SURGERY

## 2021-05-24 PROCEDURE — 99205 PR OFFICE/OUTPT VISIT, NEW, LEVL V, 60-74 MIN: ICD-10-PCS | Mod: S$PBB,,, | Performed by: ORTHOPAEDIC SURGERY

## 2021-05-24 PROCEDURE — 99213 OFFICE O/P EST LOW 20 MIN: CPT | Mod: PBBFAC | Performed by: ORTHOPAEDIC SURGERY

## 2021-05-24 PROCEDURE — 99205 OFFICE O/P NEW HI 60 MIN: CPT | Mod: S$PBB,,, | Performed by: ORTHOPAEDIC SURGERY

## 2021-05-24 PROCEDURE — 99999 PR PBB SHADOW E&M-EST. PATIENT-LVL III: ICD-10-PCS | Mod: PBBFAC,,, | Performed by: ORTHOPAEDIC SURGERY

## 2021-05-25 ENCOUNTER — DOCUMENTATION ONLY (OUTPATIENT)
Dept: REHABILITATION | Facility: HOSPITAL | Age: 36
End: 2021-05-25

## 2021-05-25 ENCOUNTER — TELEPHONE (OUTPATIENT)
Dept: ORTHOPEDICS | Facility: CLINIC | Age: 36
End: 2021-05-25

## 2021-05-26 ENCOUNTER — OFFICE VISIT (OUTPATIENT)
Dept: ORTHOPEDICS | Facility: CLINIC | Age: 36
End: 2021-05-26
Payer: OTHER GOVERNMENT

## 2021-05-26 DIAGNOSIS — S43.432D LABRAL TEAR OF SHOULDER, LEFT, SUBSEQUENT ENCOUNTER: Primary | ICD-10-CM

## 2021-05-26 PROCEDURE — 20611 DRAIN/INJ JOINT/BURSA W/US: CPT | Mod: PBBFAC | Performed by: ORTHOPAEDIC SURGERY

## 2021-05-26 PROCEDURE — 99499 UNLISTED E&M SERVICE: CPT | Mod: S$PBB,,, | Performed by: ORTHOPAEDIC SURGERY

## 2021-05-26 PROCEDURE — 99999 PR PBB SHADOW E&M-EST. PATIENT-LVL II: ICD-10-PCS | Mod: PBBFAC,,, | Performed by: ORTHOPAEDIC SURGERY

## 2021-05-26 PROCEDURE — 99999 PR PBB SHADOW E&M-EST. PATIENT-LVL II: CPT | Mod: PBBFAC,,, | Performed by: ORTHOPAEDIC SURGERY

## 2021-05-26 PROCEDURE — 99499 NO LOS: ICD-10-PCS | Mod: S$PBB,,, | Performed by: ORTHOPAEDIC SURGERY

## 2021-05-26 PROCEDURE — 20611 LARGE JOINT ASPIRATION/INJECTION: L GLENOHUMERAL: ICD-10-PCS | Mod: S$PBB,LT,, | Performed by: ORTHOPAEDIC SURGERY

## 2021-05-26 PROCEDURE — 99212 OFFICE O/P EST SF 10 MIN: CPT | Mod: PBBFAC | Performed by: ORTHOPAEDIC SURGERY

## 2021-05-26 RX ORDER — METHYLPREDNISOLONE ACETATE 80 MG/ML
80 INJECTION, SUSPENSION INTRA-ARTICULAR; INTRALESIONAL; INTRAMUSCULAR; SOFT TISSUE
Status: DISCONTINUED | OUTPATIENT
Start: 2021-05-26 | End: 2021-05-26 | Stop reason: HOSPADM

## 2021-05-26 RX ADMIN — METHYLPREDNISOLONE ACETATE 80 MG: 80 INJECTION, SUSPENSION INTRALESIONAL; INTRAMUSCULAR; INTRASYNOVIAL; SOFT TISSUE at 09:05

## 2021-06-11 ENCOUNTER — TELEPHONE (OUTPATIENT)
Dept: ORTHOPEDICS | Facility: CLINIC | Age: 36
End: 2021-06-11

## 2021-11-24 VITALS — HEIGHT: 70 IN | BODY MASS INDEX: 21.19 KG/M2

## 2021-11-24 LAB
GLUCOSE SERPL-MCNC: 98 MG/DL (ref 60–140)
HDLC SERPL-MCNC: 14 MG/DL
POC CHOLESTEROL, TOTAL: 99 MG/DL
TRIGL SERPL-MCNC: 70 MG/DL

## 2021-12-27 ENCOUNTER — OFFICE VISIT (OUTPATIENT)
Dept: INTERNAL MEDICINE | Facility: CLINIC | Age: 36
End: 2021-12-27

## 2021-12-27 ENCOUNTER — PATIENT MESSAGE (OUTPATIENT)
Dept: INTERNAL MEDICINE | Facility: CLINIC | Age: 36
End: 2021-12-27

## 2021-12-27 ENCOUNTER — CLINICAL SUPPORT (OUTPATIENT)
Dept: INTERNAL MEDICINE | Facility: CLINIC | Age: 36
End: 2021-12-27
Payer: OTHER GOVERNMENT

## 2021-12-27 ENCOUNTER — CLINICAL SUPPORT (OUTPATIENT)
Dept: AUDIOLOGY | Facility: CLINIC | Age: 36
End: 2021-12-27

## 2021-12-27 ENCOUNTER — CLINICAL SUPPORT (OUTPATIENT)
Dept: INTERNAL MEDICINE | Facility: CLINIC | Age: 36
End: 2021-12-27

## 2021-12-27 VITALS
SYSTOLIC BLOOD PRESSURE: 111 MMHG | BODY MASS INDEX: 21.51 KG/M2 | OXYGEN SATURATION: 98 % | DIASTOLIC BLOOD PRESSURE: 69 MMHG | TEMPERATURE: 98 F | HEART RATE: 81 BPM | WEIGHT: 149.94 LBS

## 2021-12-27 DIAGNOSIS — L98.9 LESION OF SKIN OF NOSE: ICD-10-CM

## 2021-12-27 DIAGNOSIS — Z71.3 DIETARY COUNSELING: ICD-10-CM

## 2021-12-27 DIAGNOSIS — Z83.79 FAMILY HISTORY OF BARRETT'S ESOPHAGUS: ICD-10-CM

## 2021-12-27 DIAGNOSIS — K21.9 GASTROESOPHAGEAL REFLUX DISEASE WITHOUT ESOPHAGITIS: ICD-10-CM

## 2021-12-27 DIAGNOSIS — Z01.12 HEARING CONSERVATION AND TREATMENT EXAM: Primary | ICD-10-CM

## 2021-12-27 DIAGNOSIS — H91.90 MILD HEARING LOSS: ICD-10-CM

## 2021-12-27 DIAGNOSIS — Z00.00 ROUTINE GENERAL MEDICAL EXAMINATION AT A HEALTH CARE FACILITY: Primary | ICD-10-CM

## 2021-12-27 DIAGNOSIS — Z00.00 PREVENTATIVE HEALTH CARE: Primary | ICD-10-CM

## 2021-12-27 LAB
ALBUMIN SERPL BCP-MCNC: 4.2 G/DL (ref 3.5–5.2)
ALP SERPL-CCNC: 68 U/L (ref 55–135)
ALT SERPL W/O P-5'-P-CCNC: 19 U/L (ref 10–44)
ANION GAP SERPL CALC-SCNC: 8 MMOL/L (ref 8–16)
AST SERPL-CCNC: 18 U/L (ref 10–40)
BILIRUB SERPL-MCNC: 0.8 MG/DL (ref 0.1–1)
BILIRUB UR QL STRIP: NEGATIVE
BUN SERPL-MCNC: 20 MG/DL (ref 6–20)
CALCIUM SERPL-MCNC: 9.3 MG/DL (ref 8.7–10.5)
CHLORIDE SERPL-SCNC: 107 MMOL/L (ref 95–110)
CHOLEST SERPL-MCNC: 185 MG/DL (ref 120–199)
CHOLEST/HDLC SERPL: 3.5 {RATIO} (ref 2–5)
CLARITY UR: CLEAR
CO2 SERPL-SCNC: 28 MMOL/L (ref 23–29)
COLOR UR: YELLOW
CREAT SERPL-MCNC: 1 MG/DL (ref 0.5–1.4)
ERYTHROCYTE [DISTWIDTH] IN BLOOD BY AUTOMATED COUNT: 11.2 % (ref 11.5–14.5)
EST. GFR  (AFRICAN AMERICAN): >60 ML/MIN/1.73 M^2
EST. GFR  (NON AFRICAN AMERICAN): >60 ML/MIN/1.73 M^2
ESTIMATED AVG GLUCOSE: 97 MG/DL (ref 68–131)
GLUCOSE SERPL-MCNC: 93 MG/DL (ref 70–110)
GLUCOSE UR QL STRIP: NEGATIVE
HBA1C MFR BLD: 5 % (ref 4–5.6)
HCT VFR BLD AUTO: 40 % (ref 40–54)
HDLC SERPL-MCNC: 53 MG/DL (ref 40–75)
HDLC SERPL: 28.6 % (ref 20–50)
HGB BLD-MCNC: 14 G/DL (ref 14–18)
HGB UR QL STRIP: NEGATIVE
KETONES UR QL STRIP: NEGATIVE
LDLC SERPL CALC-MCNC: 120.2 MG/DL (ref 63–159)
LEUKOCYTE ESTERASE UR QL STRIP: NEGATIVE
MCH RBC QN AUTO: 31.5 PG (ref 27–31)
MCHC RBC AUTO-ENTMCNC: 35 G/DL (ref 32–36)
MCV RBC AUTO: 90 FL (ref 82–98)
NITRITE UR QL STRIP: NEGATIVE
NONHDLC SERPL-MCNC: 132 MG/DL
PH UR STRIP: 7 [PH] (ref 5–8)
PLATELET # BLD AUTO: 180 K/UL (ref 150–450)
PMV BLD AUTO: 9.2 FL (ref 9.2–12.9)
POTASSIUM SERPL-SCNC: 4.2 MMOL/L (ref 3.5–5.1)
PROT SERPL-MCNC: 7.4 G/DL (ref 6–8.4)
PROT UR QL STRIP: NEGATIVE
RBC # BLD AUTO: 4.44 M/UL (ref 4.6–6.2)
SODIUM SERPL-SCNC: 143 MMOL/L (ref 136–145)
SP GR UR STRIP: 1.02 (ref 1–1.03)
TRIGL SERPL-MCNC: 59 MG/DL (ref 30–150)
URN SPEC COLLECT METH UR: NORMAL
WBC # BLD AUTO: 5.71 K/UL (ref 3.9–12.7)

## 2021-12-27 PROCEDURE — 83655 ASSAY OF LEAD: CPT | Performed by: FAMILY MEDICINE

## 2021-12-27 PROCEDURE — 97802 MEDICAL NUTRITION INDIV IN: CPT | Mod: S$GLB,,, | Performed by: INTERNAL MEDICINE

## 2021-12-27 PROCEDURE — 99999 PR PBB SHADOW E&M-EST. PATIENT-LVL IV: CPT | Mod: PBBFAC,,, | Performed by: FAMILY MEDICINE

## 2021-12-27 PROCEDURE — 92552 PR PURE TONE AUDIOMETRY, AIR: ICD-10-PCS | Mod: S$GLB,,, | Performed by: AUDIOLOGIST-HEARING AID FITTER

## 2021-12-27 PROCEDURE — 80053 COMPREHEN METABOLIC PANEL: CPT | Performed by: FAMILY MEDICINE

## 2021-12-27 PROCEDURE — 99395 PREV VISIT EST AGE 18-39: CPT | Mod: S$GLB,,, | Performed by: FAMILY MEDICINE

## 2021-12-27 PROCEDURE — 80061 LIPID PANEL: CPT | Performed by: FAMILY MEDICINE

## 2021-12-27 PROCEDURE — 97802 PR MED NUTR THER, 1ST, INDIV, EA 15 MIN: ICD-10-PCS | Mod: S$GLB,,, | Performed by: INTERNAL MEDICINE

## 2021-12-27 PROCEDURE — 81003 URINALYSIS AUTO W/O SCOPE: CPT | Performed by: FAMILY MEDICINE

## 2021-12-27 PROCEDURE — 99395 PR PREVENTIVE VISIT,EST,18-39: ICD-10-PCS | Mod: S$GLB,,, | Performed by: FAMILY MEDICINE

## 2021-12-27 PROCEDURE — 99999 PR PBB SHADOW E&M-EST. PATIENT-LVL IV: ICD-10-PCS | Mod: PBBFAC,,, | Performed by: FAMILY MEDICINE

## 2021-12-27 PROCEDURE — 85027 COMPLETE CBC AUTOMATED: CPT | Performed by: FAMILY MEDICINE

## 2021-12-27 PROCEDURE — 92552 PURE TONE AUDIOMETRY AIR: CPT | Mod: S$GLB,,, | Performed by: AUDIOLOGIST-HEARING AID FITTER

## 2021-12-27 PROCEDURE — 83036 HEMOGLOBIN GLYCOSYLATED A1C: CPT | Performed by: FAMILY MEDICINE

## 2021-12-27 RX ORDER — LANOLIN ALCOHOL/MO/W.PET/CERES
400 CREAM (GRAM) TOPICAL DAILY
Status: ON HOLD | COMMUNITY
End: 2022-03-09

## 2021-12-28 ENCOUNTER — PATIENT MESSAGE (OUTPATIENT)
Dept: ENDOSCOPY | Facility: HOSPITAL | Age: 36
End: 2021-12-28
Payer: OTHER GOVERNMENT

## 2021-12-28 DIAGNOSIS — Z01.818 PRE-OP TESTING: Primary | ICD-10-CM

## 2021-12-29 LAB
LEAD BLD-MCNC: <1 MCG/DL
SPECIMEN SOURCE: NORMAL
STATE OF RESIDENCE: NORMAL

## 2021-12-31 PROBLEM — H91.90 MILD HEARING LOSS: Status: ACTIVE | Noted: 2021-12-31

## 2022-03-02 ENCOUNTER — OFFICE VISIT (OUTPATIENT)
Dept: DERMATOLOGY | Facility: CLINIC | Age: 37
End: 2022-03-02
Payer: OTHER GOVERNMENT

## 2022-03-02 DIAGNOSIS — D18.00 HEMANGIOMA, UNSPECIFIED SITE: ICD-10-CM

## 2022-03-02 DIAGNOSIS — L82.1 SEBORRHEIC KERATOSIS: ICD-10-CM

## 2022-03-02 DIAGNOSIS — D22.9 MULTIPLE NEVI: Primary | ICD-10-CM

## 2022-03-02 DIAGNOSIS — I78.1 TELANGIECTASIA: ICD-10-CM

## 2022-03-02 DIAGNOSIS — L98.9 LESION OF SKIN OF NOSE: ICD-10-CM

## 2022-03-02 PROCEDURE — 99203 OFFICE O/P NEW LOW 30 MIN: CPT | Mod: S$PBB,,, | Performed by: PHYSICIAN ASSISTANT

## 2022-03-02 PROCEDURE — 99203 PR OFFICE/OUTPT VISIT, NEW, LEVL III, 30-44 MIN: ICD-10-PCS | Mod: S$PBB,,, | Performed by: PHYSICIAN ASSISTANT

## 2022-03-02 PROCEDURE — 99999 PR PBB SHADOW E&M-EST. PATIENT-LVL III: CPT | Mod: PBBFAC,,, | Performed by: PHYSICIAN ASSISTANT

## 2022-03-02 PROCEDURE — 99213 OFFICE O/P EST LOW 20 MIN: CPT | Mod: PBBFAC,PO | Performed by: PHYSICIAN ASSISTANT

## 2022-03-02 PROCEDURE — 99999 PR PBB SHADOW E&M-EST. PATIENT-LVL III: ICD-10-PCS | Mod: PBBFAC,,, | Performed by: PHYSICIAN ASSISTANT

## 2022-03-02 NOTE — PROGRESS NOTES
Subjective:       Patient ID:  Fco Millan is a 36 y.o. male who presents for   Chief Complaint   Patient presents with    Skin Check     C/o spots on nose and leg, family hx of skin cancer     History of Present Illness: The patient presents with chief complaint of lesion.  Location: right nose  Duration: 1 year  Signs/Symptoms: rough, dry, reddish color, occasional bleeding  Referred by Dr. Zamudio, PCP, for rough scaly lesion of right nose on 12/27/21. 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 Not consistently wearing spf daily.    Prior treatments: none    +FHX of NMSC (maternal GM)    C/o spot on left leg. + lump or cyst like, marble sized. Endorses some intermittent tenderness after workouts. Denies redness, swelling, or drainage.      Review of Systems   Constitutional: Negative for fever and chills.   Gastrointestinal: Negative for nausea and vomiting.   Skin: Positive for activity-related sunscreen use. Negative for itching, rash, dry skin, sun sensitivity, daily sunscreen use, recent sunburn, dry lips and abscesses.   Hematologic/Lymphatic: Does not bruise/bleed easily.        Objective:    Physical Exam   Constitutional: He appears well-developed and well-nourished. No distress.   Neurological: He is alert and oriented to person, place, and time. He is not disoriented.   Psychiatric: He has a normal mood and affect.   Skin:   Areas Examined (abnormalities noted in diagram):   Scalp / Hair Palpated and Inspected  Head / Face Inspection Performed  Neck Inspection Performed  Chest / Axilla Inspection Performed  Abdomen Inspection Performed  Back Inspection Performed  RUE Inspected  LUE Inspection Performed  RLE Inspected  LLE Inspection Performed                           Diagram Legend     Erythematous scaling macule/papule c/w actinic keratosis       Vascular papule c/w angioma      Pigmented verrucoid papule/plaque c/w seborrheic keratosis       Yellow umbilicated papule c/w sebaceous hyperplasia      Irregularly shaped tan macule c/w lentigo     1-2 mm smooth white papules consistent with Milia      Movable subcutaneous cyst with punctum c/w epidermal inclusion cyst      Subcutaneous movable cyst c/w pilar cyst      Firm pink to brown papule c/w dermatofibroma      Pedunculated fleshy papule(s) c/w skin tag(s)      Evenly pigmented macule c/w junctional nevus     Mildly variegated pigmented, slightly irregular-bordered macule c/w mildly atypical nevus      Flesh colored to evenly pigmented papule c/w intradermal nevus       Pink pearly papule/plaque c/w basal cell carcinoma      Erythematous hyperkeratotic cursted plaque c/w SCC      Surgical scar with no sign of skin cancer recurrence      Open and closed comedones      Inflammatory papules and pustules      Verrucoid papule consistent consistent with wart     Erythematous eczematous patches and plaques     Dystrophic onycholytic nail with subungual debris c/w onychomycosis     Umbilicated papule    Erythematous-base heme-crusted tan verrucoid plaque consistent with inflamed seborrheic keratosis     Erythematous Silvery Scaling Plaque c/w Psoriasis     See annotation      Assessment / Plan:        Multiple nevi  Reviewed ABCDEFs of moles, recommend regular skin exams and mole monitoring. Encouraged daily spf 30, mineral based, Cetaphil Sheer sample provided. RTC for FSE in 3-4 months.    Lesion of skin of nose  -     Ambulatory referral/consult to Dermatology  Reassurance given.  Lesions are benign.    Seborrheic keratosis  Reassurance given.  Lesions are benign.    Telangiectasia  Reassurance given.  Lesions are benign.    Hemangioma, unspecified site  Reassurance given.  Lesions are benign.           Follow up in about 3 months (around 6/2/2022) for Full Skin Exam.

## 2022-03-02 NOTE — PATIENT INSTRUCTIONS
Sunscreens for the Face    La Roche-Posay mineral    Cetaphil Sheer Mineral     *Elta MD UV Clear ([employer] or [employer])    Blue Lizard for sensitive skin

## 2022-03-03 ENCOUNTER — TELEPHONE (OUTPATIENT)
Dept: PREADMISSION TESTING | Facility: HOSPITAL | Age: 37
End: 2022-03-03
Payer: OTHER GOVERNMENT

## 2022-03-03 NOTE — PRE-PROCEDURE INSTRUCTIONS
PAT call completed.  Patient educated on procedure instructions.  Medical history discussed and patient informed of arrival time of 6:30 AM on Wednesday, March 9, 2022 at the [facility], and was made aware of the limited-visitor policy, and that  is to remain during the entire visit.  All questions and concerns addressed.  Endoscopy instructions reviewed. Instructed nothing to eat or drink after midnight the night before the procedure.    Pre-procedure covid testing not needed, pt tested covid + on 01/12/22, < 60 days.   Patient verbalized understanding of all instructions.

## 2022-03-03 NOTE — TELEPHONE ENCOUNTER
PAT call completed.  Patient educated on procedure instructions.  Medical history discussed and patient informed of arrival time of 6:30 AM on Wednesday, March 9, 2022 at the [facility], and was made aware of the limited-visitor policy, and that  is to remain during the entire visit.  All questions and concerns addressed.  Endoscopy instructions reviewed. Instructed nothing to eat or drink after midnight the night before the procedure.    Pre-procedure covid testing not needed, pt tested covid + on 01/12/22, < 60 days.   Patient verbalized understanding of all instructions.

## 2022-03-04 ENCOUNTER — ANESTHESIA EVENT (OUTPATIENT)
Dept: ENDOSCOPY | Facility: HOSPITAL | Age: 37
End: 2022-03-04
Payer: OTHER GOVERNMENT

## 2022-03-04 NOTE — ANESTHESIA PREPROCEDURE EVALUATION
03/04/2022  Fco Millan is a 36 y.o., male.  Past Medical History:   Diagnosis Date    Gastroesophageal reflux disease without esophagitis 3/11/2021    GERD (gastroesophageal reflux disease)      Past Surgical History:   Procedure Laterality Date    APPENDECTOMY      TONSILLECTOMY     No current facility-administered medications for this encounter.    Current Outpatient Medications:     esomeprazole magnesium (NEXIUM 24HR ORAL), Take 1 tablet by mouth once daily., Disp: , Rfl:     magnesium oxide (MAG-OX) 400 mg (241.3 mg magnesium) tablet, Take 400 mg by mouth once daily., Disp: , Rfl:           Pre-op Assessment    I have reviewed the Patient Summary Reports.     I have reviewed the Nursing Notes. I have reviewed the NPO Status.   I have reviewed the Medications.     Review of Systems  Anesthesia Hx:  No problems with previous Anesthesia  Neg history of prior surgery. Denies Family Hx of Anesthesia complications.   Denies Personal Hx of Anesthesia complications.   Social:  Non-Smoker, Social Alcohol Use    Hepatic/GI:   GERD        Physical Exam  General: Well nourished    Airway:  Mallampati: I   Mouth Opening: Normal  Tongue: Normal  Neck ROM: Normal ROM    Dental:  Intact        Anesthesia Plan  Type of Anesthesia, risks & benefits discussed:    Anesthesia Type: Gen Natural Airway  Intra-op Monitoring Plan: Standard ASA Monitors  Post Op Pain Control Plan: multimodal analgesia  Induction:  IV  Informed Consent: Informed consent signed with the Patient and all parties understand the risks and agree with anesthesia plan.  All questions answered.   ASA Score: 2  Day of Surgery Review of History & Physical: H&P Update referred to the surgeon/provider.    Ready For Surgery From Anesthesia Perspective.     .

## 2022-03-09 ENCOUNTER — ANESTHESIA (OUTPATIENT)
Dept: ENDOSCOPY | Facility: HOSPITAL | Age: 37
End: 2022-03-09
Payer: OTHER GOVERNMENT

## 2022-03-09 ENCOUNTER — HOSPITAL ENCOUNTER (OUTPATIENT)
Facility: HOSPITAL | Age: 37
Discharge: HOME | End: 2022-03-09
Attending: INTERNAL MEDICINE | Admitting: INTERNAL MEDICINE
Payer: OTHER GOVERNMENT

## 2022-03-09 VITALS
RESPIRATION RATE: 16 BRPM | SYSTOLIC BLOOD PRESSURE: 110 MMHG | BODY MASS INDEX: 22 KG/M2 | TEMPERATURE: 97 F | HEART RATE: 73 BPM | DIASTOLIC BLOOD PRESSURE: 82 MMHG | HEIGHT: 70 IN | WEIGHT: 153.69 LBS | OXYGEN SATURATION: 100 %

## 2022-03-09 DIAGNOSIS — K21.9 GASTROESOPHAGEAL REFLUX DISEASE WITHOUT ESOPHAGITIS: Primary | ICD-10-CM

## 2022-03-09 PROCEDURE — 88305 TISSUE EXAM BY PATHOLOGIST: ICD-10-PCS | Mod: 26,,, | Performed by: PATHOLOGY

## 2022-03-09 PROCEDURE — 43239 EGD BIOPSY SINGLE/MULTIPLE: CPT | Mod: ,,, | Performed by: INTERNAL MEDICINE

## 2022-03-09 PROCEDURE — D9220A PRA ANESTHESIA: ICD-10-PCS | Mod: ,,, | Performed by: NURSE ANESTHETIST, CERTIFIED REGISTERED

## 2022-03-09 PROCEDURE — 63600175 PHARM REV CODE 636 W HCPCS: Performed by: INTERNAL MEDICINE

## 2022-03-09 PROCEDURE — D9220A PRA ANESTHESIA: Mod: ,,, | Performed by: NURSE ANESTHETIST, CERTIFIED REGISTERED

## 2022-03-09 PROCEDURE — 00731 ANES UPR GI NDSC PX NOS: CPT | Performed by: INTERNAL MEDICINE

## 2022-03-09 PROCEDURE — 27201012 HC FORCEPS, HOT/COLD, DISP: Performed by: INTERNAL MEDICINE

## 2022-03-09 PROCEDURE — 37000008 HC ANESTHESIA 1ST 15 MINUTES: Performed by: INTERNAL MEDICINE

## 2022-03-09 PROCEDURE — 88305 TISSUE EXAM BY PATHOLOGIST: CPT | Mod: 26,,, | Performed by: PATHOLOGY

## 2022-03-09 PROCEDURE — 37000009 HC ANESTHESIA EA ADD 15 MINS: Performed by: INTERNAL MEDICINE

## 2022-03-09 PROCEDURE — 43239 PR EGD, FLEX, W/BIOPSY, SGL/MULTI: ICD-10-PCS | Mod: ,,, | Performed by: INTERNAL MEDICINE

## 2022-03-09 PROCEDURE — 25000003 PHARM REV CODE 250: Performed by: NURSE ANESTHETIST, CERTIFIED REGISTERED

## 2022-03-09 PROCEDURE — 63600175 PHARM REV CODE 636 W HCPCS: Performed by: NURSE ANESTHETIST, CERTIFIED REGISTERED

## 2022-03-09 PROCEDURE — 43239 EGD BIOPSY SINGLE/MULTIPLE: CPT | Performed by: INTERNAL MEDICINE

## 2022-03-09 PROCEDURE — 88305 TISSUE EXAM BY PATHOLOGIST: CPT | Performed by: PATHOLOGY

## 2022-03-09 RX ORDER — PROPOFOL 10 MG/ML
VIAL (ML) INTRAVENOUS
Status: DISCONTINUED | OUTPATIENT
Start: 2022-03-09 | End: 2022-03-09

## 2022-03-09 RX ORDER — SODIUM CHLORIDE, SODIUM LACTATE, POTASSIUM CHLORIDE, CALCIUM CHLORIDE 600; 310; 30; 20 MG/100ML; MG/100ML; MG/100ML; MG/100ML
INJECTION, SOLUTION INTRAVENOUS CONTINUOUS
Status: DISCONTINUED | OUTPATIENT
Start: 2022-03-09 | End: 2022-03-09 | Stop reason: HOSPADM

## 2022-03-09 RX ORDER — LIDOCAINE HYDROCHLORIDE 20 MG/ML
INJECTION, SOLUTION EPIDURAL; INFILTRATION; INTRACAUDAL; PERINEURAL
Status: DISCONTINUED | OUTPATIENT
Start: 2022-03-09 | End: 2022-03-09

## 2022-03-09 RX ADMIN — PROPOFOL 20 MG: 10 INJECTION, EMULSION INTRAVENOUS at 08:03

## 2022-03-09 RX ADMIN — PROPOFOL 50 MG: 10 INJECTION, EMULSION INTRAVENOUS at 07:03

## 2022-03-09 RX ADMIN — PROPOFOL 50 MG: 10 INJECTION, EMULSION INTRAVENOUS at 08:03

## 2022-03-09 RX ADMIN — LIDOCAINE HYDROCHLORIDE 20 MG: 20 INJECTION, SOLUTION EPIDURAL; INFILTRATION; INTRACAUDAL; PERINEURAL at 07:03

## 2022-03-09 RX ADMIN — SODIUM CHLORIDE, SODIUM LACTATE, POTASSIUM CHLORIDE, AND CALCIUM CHLORIDE: 600; 310; 30; 20 INJECTION, SOLUTION INTRAVENOUS at 07:03

## 2022-03-09 NOTE — PLAN OF CARE
Discharge instructions reviewed with pt, handouts given, verbalized understanding with no further questions at this time. Dr. West spoke to pt at bedside, reviewed procedure and answered questions aware they are awaiting biopsy results with MD telephone number provided per AVS sheet. VSS on RA, no pain or nausea noted, no other complaints noted. Fall precautions reviewed, consents in chart, PIV removed.

## 2022-03-09 NOTE — TRANSFER OF CARE
"Anesthesia Transfer of Care Note    Patient: Fco Millan    Procedure(s) Performed: Procedure(s) (LRB):  ESOPHAGOGASTRODUODENOSCOPY (EGD) (N/A)    Patient location: PACU    Anesthesia Type: general    Transport from OR: Transported from OR on room air with adequate spontaneous ventilation    Post pain: adequate analgesia    Post assessment: no apparent anesthetic complications and tolerated procedure well    Post vital signs: stable    Level of consciousness: awake and alert    Nausea/Vomiting: no nausea/vomiting    Complications: none    Transfer of care protocol was followed      Last vitals:   Visit Vitals  BP (!) 99/59 (BP Location: Right arm, Patient Position: Lying)   Pulse 69   Temp 36.2 °C (97.2 °F) (Temporal)   Resp 15   Ht 5' 10" (1.778 m)   Wt 69.7 kg (153 lb 10.6 oz)   SpO2 100%   BMI 22.05 kg/m²     "
4 = completely dependent

## 2022-03-09 NOTE — PROVATION PATIENT INSTRUCTIONS
Discharge Summary/Instructions after an Endoscopic Procedure  Patient Name: Fco Millan  Patient MRN: [id number]  Patient YOB: 1985  Wednesday, March 9, 2022  Sarahi West MD  Dear patient,  As a result of recent federal legislation (The Federal Cures Act), you may   receive lab or pathology results from your procedure in your [facility]   account before your physician is able to contact you. Your physician or   their representative will relay the results to you with their   recommendations at their soonest availability.  Thank you,  RESTRICTIONS:  During your procedure today, you received medications for sedation.  These   medications may affect your judgment, balance and coordination.  Therefore,   for 24 hours, you have the following restrictions:   - DO NOT drive a car, operate machinery, make legal/financial decisions,   sign important papers or drink alcohol.    ACTIVITY:  Today: no heavy lifting, straining or running due to procedural   sedation/anesthesia.  The following day: return to full activity including work.  DIET:  Eat and drink normally unless instructed otherwise.     TREATMENT FOR COMMON SIDE EFFECTS:  - Mild abdominal pain, nausea, belching, bloating or excessive gas:  rest,   eat lightly and use a heating pad.  - Sore Throat: treat with throat lozenges and/or gargle with warm salt   water.  - Because air was used during the procedure, expelling large amounts of air   from your rectum or belching is normal.  - If a bowel prep was taken, you may not have a bowel movement for 1-3 days.    This is normal.  SYMPTOMS TO WATCH FOR AND REPORT TO YOUR PHYSICIAN:  1. Abdominal pain or bloating, other than gas cramps.  2. Chest pain.  3. Back pain.  4. Signs of infection such as: chills or fever occurring within 24 hours   after the procedure.  5. Rectal bleeding, which would show as bright red, maroon, or black stools.   (A tablespoon of blood from the rectum is not serious, especially if    hemorrhoids are present.)  6. Vomiting.  7. Weakness or dizziness.  GO DIRECTLY TO THE NEAREST EMERGENCY ROOM IF YOU HAVE ANY OF THE FOLLOWING:      Difficulty breathing              Chills and/or fever over 101 F   Persistent vomiting and/or vomiting blood   Severe abdominal pain   Severe chest pain   Black, tarry stools   Bleeding- more than one tablespoon   Any other symptom or condition that you feel may need urgent attention  Your doctor recommends these additional instructions:  If any biopsies were taken, your doctors clinic will contact you in 1 to 2   weeks with any results.  - Patient has a contact number available for emergencies.  The signs and   symptoms of potential delayed complications were discussed with the   patient.  Return to normal activities tomorrow.  Written discharge   instructions were provided to the patient.   - Discharge patient to home (via wheelchair).   - Resume previous diet today.   - Continue present medications.   - Await pathology results.  For questions, problems or results please call your physician Sarahi West MD at Work:  [phone]  If you have any questions about the above instructions, call the GI   department at [phone] or call the endoscopy unit at [phone]   from 7am until 3 pm.  [facility] [place], EMERGENCY ROOM PHONE NUMBER:   [phone]  IF A COMPLICATION OR EMERGENCY SITUATION ARISES AND YOU ARE UNABLE TO REACH   YOUR PHYSICIAN - GO DIRECTLY TO THE EMERGENCY ROOM.  I have read or have had read to me these discharge instructions for my   procedure and have received a written copy.  I understand these   instructions and will follow-up with my physician if I have any questions.     __________________________________       _____________________________________  Nurse Signature                                          Patient/Designated   Responsible Party Signature  MD Sarahi Olea MD  3/9/2022 8:06:27  AM  This report has been verified and signed electronically.  Dear patient,  As a result of recent federal legislation (The Federal Cures Act), you may   receive lab or pathology results from your procedure in your [facility]   account before your physician is able to contact you. Your physician or   their representative will relay the results to you with their   recommendations at their soonest availability.  Thank you,  PROVATION

## 2022-03-09 NOTE — H&P
"PRE PROCEDURE H&P    Patient Name: Fco Millan  MRN: [id number]  : 1985  Date of Procedure:  3/9/2022  Referring Physician: OLGA Zamudio MD  Primary Physician: EFRAIN Zamudio MD  Procedure Physician: Sarahi West MD       Planned Procedure: EGD  Diagnosis: GERD   Chief Complaint: Same as above    HPI: Patient is an 36 y.o. male is here for the above.       Past Medical History:   Past Medical History:   Diagnosis Date    Gastroesophageal reflux disease without esophagitis 3/11/2021    GERD (gastroesophageal reflux disease)         Past Surgical History:  Past Surgical History:   Procedure Laterality Date    APPENDECTOMY      TONSILLECTOMY          Home Medications:  Prior to Admission medications    Medication Sig Start Date End Date Taking? Authorizing Provider   esomeprazole magnesium (NEXIUM 24HR ORAL) Take 1 tablet by mouth once daily. 3/1/20  Yes Historical Provider   magnesium oxide (MAG-OX) 400 mg (241.3 mg magnesium) tablet Take 400 mg by mouth once daily.  3/9/22  Historical Provider        Allergies:  Review of patient's allergies indicates:  No Known Allergies     Social History:   Social History     Socioeconomic History    Marital status:    Tobacco Use    Smoking status: Never Smoker    Smokeless tobacco: Never Used   Substance and Sexual Activity    Alcohol use: No     Alcohol/week: 0.0 standard drinks    Drug use: No    Sexual activity: Yes     Partners: Female       Family History:  Family History   Problem Relation Age of Onset    Liver disease Mother     Heart disease Mother     Hypertrophic cardiomyopathy Mother     Heart attack Father     Cancer Paternal Grandmother     Heart attack Maternal Grandfather        ROS: No acute cardiac events, no acute respiratory complaints.     Physical Exam (all patients):    /72 (BP Location: Right arm, Patient Position: Sitting)   Pulse 82   Temp 97.6 °F (36.4 °C) (Temporal)   Resp 20   Ht 5' 10" (1.778 " m)   Wt 69.7 kg (153 lb 10.6 oz)   SpO2 99%   BMI 22.05 kg/m²   Lungs: Clear to auscultation bilaterally, respirations unlabored  Heart: Regular rate and rhythm, S1 and S2 normal, no obvious murmurs  Abdomen:         Soft, non-tender, bowel sounds normal, no masses, no organomegaly    Lab Results   Component Value Date    WBC 5.71 12/27/2021    MCV 90 12/27/2021    RDW 11.2 (L) 12/27/2021     12/27/2021    GLU 93 12/27/2021    HGBA1C 5.0 12/27/2021    BUN 20 12/27/2021     12/27/2021    K 4.2 12/27/2021     12/27/2021        SEDATION PLAN: per anesthesia      History reviewed, vital signs satisfactory, cardiopulmonary status satisfactory, sedation options, risks and plans have been discussed with the patient  All their questions were answered and the patient agrees to the sedation procedures as planned and the patient is deemed an appropriate candidate for the sedation as planned.    Procedure explained to patient, informed consent obtained and placed in chart.    Sarahi West  3/9/2022  7:49 AM

## 2022-03-09 NOTE — ANESTHESIA POSTPROCEDURE EVALUATION
Anesthesia Post Evaluation    Patient: Fco Millan    Procedure(s) Performed: Procedure(s) (LRB):  ESOPHAGOGASTRODUODENOSCOPY (EGD) (N/A)    Final Anesthesia Type: general      Patient location during evaluation: PACU  Patient participation: Yes- Able to Participate  Level of consciousness: awake and alert and oriented  Post-procedure vital signs: reviewed and stable  Pain management: adequate  Airway patency: patent    PONV status at discharge: No PONV  Anesthetic complications: no      Cardiovascular status: blood pressure returned to baseline, stable and hemodynamically stable  Respiratory status: unassisted  Hydration status: euvolemic  Follow-up not needed.          Vitals Value Taken Time   /82 03/09/22 0832   Temp 36.2 °C (97.2 °F) 03/09/22 0808   Pulse 73 03/09/22 0832   Resp 16 03/09/22 0832   SpO2 100 % 03/09/22 0832         Event Time   Out of Recovery 08:38:00         Pain/Cj Score: Cj Score: 10 (3/9/2022  8:32 AM)

## 2022-03-23 LAB
FINAL PATHOLOGIC DIAGNOSIS: NORMAL
GROSS: NORMAL
Lab: NORMAL

## 2022-06-22 ENCOUNTER — OFFICE VISIT (OUTPATIENT)
Dept: DERMATOLOGY | Facility: CLINIC | Age: 37
End: 2022-06-22
Payer: OTHER GOVERNMENT

## 2022-06-22 DIAGNOSIS — D22.9 MULTIPLE NEVI: Primary | ICD-10-CM

## 2022-06-22 DIAGNOSIS — D18.00 HEMANGIOMA, UNSPECIFIED SITE: ICD-10-CM

## 2022-06-22 DIAGNOSIS — L82.1 SEBORRHEIC KERATOSIS: ICD-10-CM

## 2022-06-22 PROCEDURE — 99213 OFFICE O/P EST LOW 20 MIN: CPT | Mod: S$PBB,,, | Performed by: PHYSICIAN ASSISTANT

## 2022-06-22 PROCEDURE — 99999 PR PBB SHADOW E&M-EST. PATIENT-LVL II: CPT | Mod: PBBFAC,,, | Performed by: PHYSICIAN ASSISTANT

## 2022-06-22 PROCEDURE — 99212 OFFICE O/P EST SF 10 MIN: CPT | Mod: PBBFAC,PO | Performed by: PHYSICIAN ASSISTANT

## 2022-06-22 PROCEDURE — 99999 PR PBB SHADOW E&M-EST. PATIENT-LVL II: ICD-10-PCS | Mod: PBBFAC,,, | Performed by: PHYSICIAN ASSISTANT

## 2022-06-22 PROCEDURE — 99213 PR OFFICE/OUTPT VISIT, EST, LEVL III, 20-29 MIN: ICD-10-PCS | Mod: S$PBB,,, | Performed by: PHYSICIAN ASSISTANT

## 2022-06-22 NOTE — PROGRESS NOTES
Subjective:       Patient ID:  Fco Millan is a 37 y.o. male who presents for   Chief Complaint   Patient presents with    Skin Check     FBSE     Hx of multiple nevi, angiomas, telangiectasia of nose, and SKs, last seen 3/2/22. Here for upper body skin check. 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     Hx of subQ nodule of left thigh, duration 1 + year, marble sized, intermittent tenderness after workouts. Denies redness, swelling, drainage, change in size or shape.     Denies any new, changing, or bleeding lesions.    King personal h/o skin CA; +FHX of NMSC (maternal GM)      Review of Systems   Constitutional: Negative for fever and chills.   Gastrointestinal: Negative for nausea and vomiting.   Skin: Positive for activity-related sunscreen use. Negative for itching, rash, dry skin, sun sensitivity, daily sunscreen use, recent sunburn, dry lips and abscesses.   Hematologic/Lymphatic: Does not bruise/bleed easily.        Objective:    Physical Exam   Constitutional: He appears well-developed and well-nourished. No distress.   Neurological: He is alert and oriented to person, place, and time. He is not disoriented.   Psychiatric: He has a normal mood and affect.   Skin:   Areas Examined (abnormalities noted in diagram):   Head / Face Inspection Performed  Neck Inspection Performed  Chest / Axilla Inspection Performed  Abdomen Inspection Performed  Back Inspection Performed  RUE Inspected  LUE Inspection Performed  RLE Inspected  LLE Inspection Performed                       Diagram Legend     Erythematous scaling macule/papule c/w actinic keratosis       Vascular papule c/w angioma      Pigmented verrucoid papule/plaque c/w seborrheic keratosis      Yellow umbilicated papule c/w sebaceous hyperplasia      Irregularly shaped tan macule c/w lentigo     1-2 mm smooth white papules consistent with Milia      Movable subcutaneous cyst with punctum  c/w epidermal inclusion cyst      Subcutaneous movable cyst c/w pilar cyst      Firm pink to brown papule c/w dermatofibroma      Pedunculated fleshy papule(s) c/w skin tag(s)      Evenly pigmented macule c/w junctional nevus     Mildly variegated pigmented, slightly irregular-bordered macule c/w mildly atypical nevus      Flesh colored to evenly pigmented papule c/w intradermal nevus       Pink pearly papule/plaque c/w basal cell carcinoma      Erythematous hyperkeratotic cursted plaque c/w SCC      Surgical scar with no sign of skin cancer recurrence      Open and closed comedones      Inflammatory papules and pustules      Verrucoid papule consistent consistent with wart     Erythematous eczematous patches and plaques     Dystrophic onycholytic nail with subungual debris c/w onychomycosis     Umbilicated papule    Erythematous-base heme-crusted tan verrucoid plaque consistent with inflamed seborrheic keratosis     Erythematous Silvery Scaling Plaque c/w Psoriasis     See annotation      Assessment / Plan:        Multiple nevi  Reviewed ABCDEFs of moles, regular skin exams, and photoprotection.    Seborrheic keratosis  Reassurance given.  Lesions are benign.    Hemangioma  Stable, Reassurance. Will continue to monitor. If any changes, would reconsider biopsy.          Follow up in about 1 year (around 6/22/2023), or if symptoms worsen or fail to improve, for Full Skin Exam.

## 2022-11-17 ENCOUNTER — OFFICE VISIT (OUTPATIENT)
Dept: OPHTHALMOLOGY | Facility: CLINIC | Age: 37
End: 2022-11-17
Payer: OTHER GOVERNMENT

## 2022-11-17 DIAGNOSIS — H52.03 LATENT HYPEROPIA OF BOTH EYES: Primary | ICD-10-CM

## 2022-11-17 PROCEDURE — 99999 PR PBB SHADOW E&M-EST. PATIENT-LVL II: ICD-10-PCS | Mod: PBBFAC,,, | Performed by: OPTOMETRIST

## 2022-11-17 PROCEDURE — 92015 DETERMINE REFRACTIVE STATE: CPT | Mod: ,,, | Performed by: OPTOMETRIST

## 2022-11-17 PROCEDURE — 92004 PR EYE EXAM, NEW PATIENT,COMPREHESV: ICD-10-PCS | Mod: S$PBB,,, | Performed by: OPTOMETRIST

## 2022-11-17 PROCEDURE — 92015 PR REFRACTION: ICD-10-PCS | Mod: ,,, | Performed by: OPTOMETRIST

## 2022-11-17 PROCEDURE — 99999 PR PBB SHADOW E&M-EST. PATIENT-LVL II: CPT | Mod: PBBFAC,,, | Performed by: OPTOMETRIST

## 2022-11-17 PROCEDURE — 99212 OFFICE O/P EST SF 10 MIN: CPT | Mod: PBBFAC,PO | Performed by: OPTOMETRIST

## 2022-11-17 PROCEDURE — 92004 COMPRE OPH EXAM NEW PT 1/>: CPT | Mod: S$PBB,,, | Performed by: OPTOMETRIST

## 2022-12-01 DIAGNOSIS — Z00.00 ROUTINE GENERAL MEDICAL EXAMINATION AT A HEALTH CARE FACILITY: Primary | ICD-10-CM

## 2022-12-17 ENCOUNTER — OFFICE VISIT (OUTPATIENT)
Dept: URGENT CARE | Facility: CLINIC | Age: 37
End: 2022-12-17
Payer: OTHER GOVERNMENT

## 2022-12-17 VITALS
HEIGHT: 70 IN | SYSTOLIC BLOOD PRESSURE: 122 MMHG | HEART RATE: 86 BPM | TEMPERATURE: 97 F | DIASTOLIC BLOOD PRESSURE: 71 MMHG | RESPIRATION RATE: 18 BRPM | OXYGEN SATURATION: 98 % | BODY MASS INDEX: 22.19 KG/M2 | WEIGHT: 155 LBS

## 2022-12-17 DIAGNOSIS — M79.10 MYALGIA: Primary | ICD-10-CM

## 2022-12-17 DIAGNOSIS — K52.9 GASTROENTERITIS: ICD-10-CM

## 2022-12-17 DIAGNOSIS — J06.9 UPPER RESPIRATORY TRACT INFECTION, UNSPECIFIED TYPE: ICD-10-CM

## 2022-12-17 LAB
CTP QC/QA: YES
CTP QC/QA: YES
POC MOLECULAR INFLUENZA A AGN: NEGATIVE
POC MOLECULAR INFLUENZA B AGN: NEGATIVE
SARS-COV-2 AG RESP QL IA.RAPID: NEGATIVE

## 2022-12-17 PROCEDURE — 87502 POCT INFLUENZA A/B MOLECULAR: ICD-10-PCS | Mod: QW,S$GLB,, | Performed by: NURSE PRACTITIONER

## 2022-12-17 PROCEDURE — 87502 INFLUENZA DNA AMP PROBE: CPT | Mod: QW,S$GLB,, | Performed by: NURSE PRACTITIONER

## 2022-12-17 PROCEDURE — 99213 OFFICE O/P EST LOW 20 MIN: CPT | Mod: S$GLB,,, | Performed by: NURSE PRACTITIONER

## 2022-12-17 PROCEDURE — 99213 PR OFFICE/OUTPT VISIT, EST, LEVL III, 20-29 MIN: ICD-10-PCS | Mod: S$GLB,,, | Performed by: NURSE PRACTITIONER

## 2022-12-17 PROCEDURE — 87811 SARS-COV-2 COVID19 W/OPTIC: CPT | Mod: QW,S$GLB,, | Performed by: NURSE PRACTITIONER

## 2022-12-17 PROCEDURE — 87811 SARS CORONAVIRUS 2 ANTIGEN POCT, MANUAL READ: ICD-10-PCS | Mod: QW,S$GLB,, | Performed by: NURSE PRACTITIONER

## 2022-12-17 RX ORDER — ONDANSETRON 4 MG/1
4 TABLET, ORALLY DISINTEGRATING ORAL EVERY 6 HOURS PRN
Qty: 30 TABLET | Refills: 0 | Status: SHIPPED | OUTPATIENT
Start: 2022-12-17 | End: 2022-12-30

## 2022-12-17 NOTE — PROGRESS NOTES
"Subjective:       Patient ID: Fco Millan is a 37 y.o. male.    Vitals:  height is 5' 10" (1.778 m) and weight is 70.3 kg (155 lb). His tympanic temperature is 97 °F (36.1 °C). His blood pressure is 122/71 and his pulse is 86. His respiration is 18 and oxygen saturation is 98%.     Chief Complaint: Generalized Body Aches    Patient states he started feeling bad yesterday with nausea, body aches, and chills. He is concern about going back to work with COVID or Flu. Patient is also experiencing GI symptoms.     Other  This is a new problem. The current episode started yesterday. Associated symptoms include abdominal pain, chills, congestion, fatigue, headaches, myalgias and nausea.     Constitution: Positive for chills and fatigue.   HENT:  Positive for congestion.    Neck: neck negative.   Cardiovascular: Negative.    Respiratory: Negative.     Gastrointestinal:  Positive for abdominal pain and nausea.   Musculoskeletal:  Positive for muscle ache.   Neurological:  Positive for headaches.     Objective:      Physical Exam   Constitutional: He is oriented to person, place, and time. He appears well-developed. He is cooperative.  Non-toxic appearance. He does not appear ill. No distress.   HENT:   Head: Normocephalic and atraumatic.   Ears:   Right Ear: Hearing, tympanic membrane, external ear and ear canal normal.   Left Ear: Hearing, tympanic membrane, external ear and ear canal normal.   Nose: Nose normal. No mucosal edema, rhinorrhea or nasal deformity. No epistaxis. Right sinus exhibits no maxillary sinus tenderness and no frontal sinus tenderness. Left sinus exhibits no maxillary sinus tenderness and no frontal sinus tenderness.   Mouth/Throat: Uvula is midline and mucous membranes are normal. No trismus in the jaw. Normal dentition. No uvula swelling. Posterior oropharyngeal edema (mild) present. No oropharyngeal exudate, posterior oropharyngeal erythema, tonsillar abscesses or cobblestoning.   Eyes: " Conjunctivae and lids are normal. No scleral icterus.   Neck: Trachea normal and phonation normal. Neck supple. No edema present. No erythema present. No neck rigidity present.   Cardiovascular: Normal rate, regular rhythm, normal heart sounds and normal pulses.   Pulmonary/Chest: Effort normal and breath sounds normal. No respiratory distress. He has no decreased breath sounds. He has no rhonchi.   Abdominal: Normal appearance. There is abdominal tenderness in the epigastric area. There is no tenderness at McBurney's point, negative Alcantara's sign, no left CVA tenderness, negative Rovsing's sign and no right CVA tenderness.   Musculoskeletal: Normal range of motion.         General: No deformity. Normal range of motion.   Neurological: He is alert and oriented to person, place, and time. He exhibits normal muscle tone. Coordination normal.   Skin: Skin is warm, dry, intact, not diaphoretic and not pale.   Psychiatric: His speech is normal and behavior is normal. Judgment and thought content normal.   Nursing note and vitals reviewed.      Assessment:       1. Myalgia    2. Gastroenteritis    3. Upper respiratory tract infection, unspecified type          Plan:         Myalgia  -     POCT Influenza A/B MOLECULAR  -     SARS Coronavirus 2 Antigen, POCT Manual Read    Gastroenteritis  -     ondansetron (ZOFRAN-ODT) 4 MG TbDL; Take 1 tablet (4 mg total) by mouth every 6 (six) hours as needed.  Dispense: 30 tablet; Refill: 0    Upper respiratory tract infection, unspecified type         Results for orders placed or performed in visit on 12/17/22   POCT Influenza A/B MOLECULAR   Result Value Ref Range    POC Molecular Influenza A Ag Negative Negative, Not Reported    POC Molecular Influenza B Ag Negative Negative, Not Reported     Acceptable Yes    SARS Coronavirus 2 Antigen, POCT Manual Read   Result Value Ref Range    SARS Coronavirus 2 Antigen Negative Negative     Acceptable Yes           Patient presents for evaluation of URI symptoms and Gastroenteritis. He started taking Nexium and was advised to continue. Zofran prescribed.         Gastroenteritis  If your condition worsens or fails to improve we recommend that you receive another evaluation at the ER immediately or contact your PCP to discuss your concerns or return here. You must understand that you've received an urgent care treatment only and that you may be released before all your medical problems are known or treated. You the patient will arrange for followup care as instructed.   If you have diarrhea you can use Pepto Bismol.   Increase fluids and rest is important   Start off with liquid diet and progress as tolerated (see below)  Water and clear liquids are important so you do not get dehydrated. Drink a small amount at a time.  Do not force yourself to eat, especially if you have cramps, vomiting, or diarrhea. When you finally decide to start eating, do not eat large amounts at a time, even if you are hungry.  If you eat, avoid fatty, greasy, spicy, or fried foods.  Do not eat dairy products if you have diarrhea; they can make the diarrhea worse  Watch for any increase pain, fever, localized pain to right lower abdomen or continued vomiting or diarrhea.

## 2022-12-17 NOTE — PATIENT INSTRUCTIONS
Gastroenteritis  If your condition worsens or fails to improve we recommend that you receive another evaluation at the ER immediately or contact your PCP to discuss your concerns or return here. You must understand that you've received an urgent care treatment only and that you may be released before all your medical problems are known or treated. You the patient will arrange for followup care as instructed.   If you have diarrhea you can use Pepto Bismol.   Increase fluids and rest is important   Start off with liquid diet and progress as tolerated (see below)  Water and clear liquids are important so you do not get dehydrated. Drink a small amount at a time.  Do not force yourself to eat, especially if you have cramps, vomiting, or diarrhea. When you finally decide to start eating, do not eat large amounts at a time, even if you are hungry.  If you eat, avoid fatty, greasy, spicy, or fried foods.  Do not eat dairy products if you have diarrhea; they can make the diarrhea worse  Watch for any increase pain, fever, localized pain to right lower abdomen or continued vomiting or diarrhea.

## 2022-12-30 ENCOUNTER — CLINICAL SUPPORT (OUTPATIENT)
Dept: INTERNAL MEDICINE | Facility: CLINIC | Age: 37
End: 2022-12-30
Payer: OTHER GOVERNMENT

## 2022-12-30 ENCOUNTER — OFFICE VISIT (OUTPATIENT)
Dept: INTERNAL MEDICINE | Facility: CLINIC | Age: 37
End: 2022-12-30

## 2022-12-30 ENCOUNTER — CLINICAL SUPPORT (OUTPATIENT)
Dept: AUDIOLOGY | Facility: CLINIC | Age: 37
End: 2022-12-30

## 2022-12-30 ENCOUNTER — CLINICAL SUPPORT (OUTPATIENT)
Dept: INTERNAL MEDICINE | Facility: CLINIC | Age: 37
End: 2022-12-30

## 2022-12-30 ENCOUNTER — HOSPITAL ENCOUNTER (OUTPATIENT)
Dept: RADIOLOGY | Facility: HOSPITAL | Age: 37
Discharge: HOME | End: 2022-12-30
Attending: FAMILY MEDICINE
Payer: OTHER GOVERNMENT

## 2022-12-30 VITALS
SYSTOLIC BLOOD PRESSURE: 116 MMHG | OXYGEN SATURATION: 99 % | HEART RATE: 72 BPM | WEIGHT: 154 LBS | DIASTOLIC BLOOD PRESSURE: 75 MMHG | TEMPERATURE: 98 F | BODY MASS INDEX: 22.05 KG/M2 | HEIGHT: 70 IN

## 2022-12-30 DIAGNOSIS — M12.572 TRAUMATIC ARTHRITIS OF LEFT FOOT: Chronic | ICD-10-CM

## 2022-12-30 DIAGNOSIS — Z00.00 PREVENTATIVE HEALTH CARE: Primary | ICD-10-CM

## 2022-12-30 DIAGNOSIS — R39.11 URINARY HESITANCY: Chronic | ICD-10-CM

## 2022-12-30 DIAGNOSIS — Z00.00 ROUTINE GENERAL MEDICAL EXAMINATION AT A HEALTH CARE FACILITY: Primary | ICD-10-CM

## 2022-12-30 DIAGNOSIS — D22.9 MULTIPLE NEVI: Chronic | ICD-10-CM

## 2022-12-30 DIAGNOSIS — S43.432S LABRAL TEAR OF SHOULDER, LEFT, SEQUELA: Chronic | ICD-10-CM

## 2022-12-30 DIAGNOSIS — Z00.00 ROUTINE GENERAL MEDICAL EXAMINATION AT A HEALTH CARE FACILITY: ICD-10-CM

## 2022-12-30 DIAGNOSIS — K21.9 GASTROESOPHAGEAL REFLUX DISEASE WITHOUT ESOPHAGITIS: Chronic | ICD-10-CM

## 2022-12-30 DIAGNOSIS — Z01.12 HEARING CONSERVATION AND TREATMENT EXAM: Primary | ICD-10-CM

## 2022-12-30 DIAGNOSIS — Z71.3 DIETARY COUNSELING: Primary | ICD-10-CM

## 2022-12-30 PROBLEM — M75.41 IMPINGEMENT SYNDROME OF RIGHT SHOULDER: Status: RESOLVED | Noted: 2021-04-16 | Resolved: 2022-12-30

## 2022-12-30 PROBLEM — Z83.79 FAMILY HISTORY OF BARRETT'S ESOPHAGUS: Chronic | Status: ACTIVE | Noted: 2021-03-11

## 2022-12-30 PROBLEM — G89.29 CHRONIC LEFT SHOULDER PAIN: Status: RESOLVED | Noted: 2021-03-22 | Resolved: 2022-12-30

## 2022-12-30 PROBLEM — R53.1 GENERALIZED WEAKNESS: Status: RESOLVED | Noted: 2021-03-22 | Resolved: 2022-12-30

## 2022-12-30 PROBLEM — M25.60 DECREASED RANGE OF MOTION: Status: RESOLVED | Noted: 2021-03-22 | Resolved: 2022-12-30

## 2022-12-30 PROBLEM — M25.512 CHRONIC LEFT SHOULDER PAIN: Status: RESOLVED | Noted: 2021-03-22 | Resolved: 2022-12-30

## 2022-12-30 LAB
ALBUMIN SERPL BCP-MCNC: 4.2 G/DL (ref 3.5–5.2)
ALP SERPL-CCNC: 68 U/L (ref 55–135)
ALT SERPL W/O P-5'-P-CCNC: 31 U/L (ref 10–44)
ANION GAP SERPL CALC-SCNC: 8 MMOL/L (ref 8–16)
AST SERPL-CCNC: 20 U/L (ref 10–40)
BILIRUB SERPL-MCNC: 0.7 MG/DL (ref 0.1–1)
BILIRUB UR QL STRIP: NEGATIVE
BUN SERPL-MCNC: 17 MG/DL (ref 6–20)
CALCIUM SERPL-MCNC: 9.4 MG/DL (ref 8.7–10.5)
CHLORIDE SERPL-SCNC: 106 MMOL/L (ref 95–110)
CHOLEST SERPL-MCNC: 188 MG/DL (ref 120–199)
CHOLEST/HDLC SERPL: 4.1 {RATIO} (ref 2–5)
CLARITY UR: CLEAR
CO2 SERPL-SCNC: 29 MMOL/L (ref 23–29)
COLOR UR: YELLOW
CREAT SERPL-MCNC: 1 MG/DL (ref 0.5–1.4)
ERYTHROCYTE [DISTWIDTH] IN BLOOD BY AUTOMATED COUNT: 11.8 % (ref 11.5–14.5)
EST. GFR  (NO RACE VARIABLE): >60 ML/MIN/1.73 M^2
ESTIMATED AVG GLUCOSE: 94 MG/DL (ref 68–131)
GLUCOSE SERPL-MCNC: 89 MG/DL (ref 70–110)
GLUCOSE UR QL STRIP: NEGATIVE
HBA1C MFR BLD: 4.9 % (ref 4–5.6)
HCT VFR BLD AUTO: 42.1 % (ref 40–54)
HDLC SERPL-MCNC: 46 MG/DL (ref 40–75)
HDLC SERPL: 24.5 % (ref 20–50)
HGB BLD-MCNC: 14.7 G/DL (ref 14–18)
HGB UR QL STRIP: NEGATIVE
KETONES UR QL STRIP: NEGATIVE
LDLC SERPL CALC-MCNC: 128.6 MG/DL (ref 63–159)
LEUKOCYTE ESTERASE UR QL STRIP: NEGATIVE
MCH RBC QN AUTO: 31.6 PG (ref 27–31)
MCHC RBC AUTO-ENTMCNC: 34.9 G/DL (ref 32–36)
MCV RBC AUTO: 91 FL (ref 82–98)
NITRITE UR QL STRIP: NEGATIVE
NONHDLC SERPL-MCNC: 142 MG/DL
PH UR STRIP: 6 [PH] (ref 5–8)
PLATELET # BLD AUTO: 235 K/UL (ref 150–450)
PMV BLD AUTO: 8.8 FL (ref 9.2–12.9)
POTASSIUM SERPL-SCNC: 4.5 MMOL/L (ref 3.5–5.1)
PROT SERPL-MCNC: 7.1 G/DL (ref 6–8.4)
PROT UR QL STRIP: NEGATIVE
RBC # BLD AUTO: 4.65 M/UL (ref 4.6–6.2)
SODIUM SERPL-SCNC: 143 MMOL/L (ref 136–145)
SP GR UR STRIP: 1.02 (ref 1–1.03)
TRIGL SERPL-MCNC: 67 MG/DL (ref 30–150)
URN SPEC COLLECT METH UR: NORMAL
WBC # BLD AUTO: 5.38 K/UL (ref 3.9–12.7)

## 2022-12-30 PROCEDURE — 80053 COMPREHEN METABOLIC PANEL: CPT | Performed by: FAMILY MEDICINE

## 2022-12-30 PROCEDURE — 99999 PR PBB SHADOW E&M-EST. PATIENT-LVL I: CPT | Mod: PBBFAC,,, | Performed by: AUDIOLOGIST-HEARING AID FITTER

## 2022-12-30 PROCEDURE — 99999 PR PBB SHADOW E&M-EST. PATIENT-LVL I: ICD-10-PCS | Mod: PBBFAC,,, | Performed by: AUDIOLOGIST-HEARING AID FITTER

## 2022-12-30 PROCEDURE — 97802 PR MED NUTR THER, 1ST, INDIV, EA 15 MIN: ICD-10-PCS | Mod: S$GLB,,, | Performed by: INTERNAL MEDICINE

## 2022-12-30 PROCEDURE — 99999 PR PBB SHADOW E&M-EST. PATIENT-LVL III: CPT | Mod: PBBFAC,,, | Performed by: FAMILY MEDICINE

## 2022-12-30 PROCEDURE — 80061 LIPID PANEL: CPT | Performed by: FAMILY MEDICINE

## 2022-12-30 PROCEDURE — 99999 PR PBB SHADOW E&M-EST. PATIENT-LVL III: ICD-10-PCS | Mod: PBBFAC,,, | Performed by: FAMILY MEDICINE

## 2022-12-30 PROCEDURE — 97802 MEDICAL NUTRITION INDIV IN: CPT | Mod: S$GLB,,, | Performed by: INTERNAL MEDICINE

## 2022-12-30 PROCEDURE — 71046 X-RAY EXAM CHEST 2 VIEWS: CPT | Mod: 26,,, | Performed by: RADIOLOGY

## 2022-12-30 PROCEDURE — 71046 XR CHEST PA AND LATERAL: ICD-10-PCS | Mod: 26,,, | Performed by: RADIOLOGY

## 2022-12-30 PROCEDURE — 92552 PURE TONE AUDIOMETRY AIR: CPT | Mod: S$GLB,,, | Performed by: AUDIOLOGIST-HEARING AID FITTER

## 2022-12-30 PROCEDURE — 92552 PR PURE TONE AUDIOMETRY, AIR: ICD-10-PCS | Mod: S$GLB,,, | Performed by: AUDIOLOGIST-HEARING AID FITTER

## 2022-12-30 PROCEDURE — 85027 COMPLETE CBC AUTOMATED: CPT | Performed by: FAMILY MEDICINE

## 2022-12-30 PROCEDURE — 81003 URINALYSIS AUTO W/O SCOPE: CPT | Performed by: FAMILY MEDICINE

## 2022-12-30 PROCEDURE — 83036 HEMOGLOBIN GLYCOSYLATED A1C: CPT | Performed by: FAMILY MEDICINE

## 2022-12-30 PROCEDURE — 83655 ASSAY OF LEAD: CPT | Performed by: FAMILY MEDICINE

## 2022-12-30 PROCEDURE — 99395 PR PREVENTIVE VISIT,EST,18-39: ICD-10-PCS | Mod: S$GLB,,, | Performed by: FAMILY MEDICINE

## 2022-12-30 PROCEDURE — 99395 PREV VISIT EST AGE 18-39: CPT | Mod: S$GLB,,, | Performed by: FAMILY MEDICINE

## 2022-12-30 PROCEDURE — 71046 X-RAY EXAM CHEST 2 VIEWS: CPT | Mod: TC

## 2022-12-30 RX ORDER — PANTOPRAZOLE SODIUM 20 MG/1
20 TABLET, DELAYED RELEASE ORAL EVERY MORNING
Qty: 90 TABLET | Refills: 3 | Status: SHIPPED | OUTPATIENT
Start: 2022-12-30 | End: 2023-10-26 | Stop reason: SDUPTHER

## 2022-12-30 NOTE — LETTER
Dear Fco,    I enjoyed seeing you again at your recent [facility] exam.    This letter and the accompanying reports will summarize your medical history, physical exam findings, and test results.    Please send me a [facility] message if I can answer any questions.    Thanks for letting me care for you and trusting [facility] with your healthcare needs.    All the best,     OLGA Zamudio MD     ENCLOSURES       Test Results Summary for Fco Millan,  1985       CBC: The complete blood count (CBC) checks for anemia and measures your blood's red blood cells, white blood cells, and platelets.  o YOUR RESULTS: Normal or at least acceptable. No further evaluation or treatment is needed at this point.     CMP: The comprehensive metabolic panel (CMP) checks kidney function, liver function, sodium, potassium, glucose (sugar), and other aspects of your metabolism.  o YOUR RESULTS: Normal     Lipid Panel: The lipid panel measures the levels of different fatty substances in your blood (cholesterol and triglycerides) that can contribute to plaque buildup in your arteries (atherosclerosis).  o YOUR RESULTS: Normal     Urinalysis: A test that examines urine. The test can be chemical, microscopic, or both.  o YOUR RESULTS: Normal     A1c: The hemoglobin A1c (A1c) is a test that evaluates and screens for diabetes.  o YOUR RESULTS: Normal     Serum Lead Level:  A measure of the amount of lead (a toxic heavy metal) in your blood.  o YOUR RESULTS: Normal     Audiometry: Audiometry is a test that measures a person's ability to hear at various sound frequencies.  o YOUR RESULTS: Normal hearing from 250-8000 Hz for the right ear. Mild hearing loss 250-8000 Hz for the left ear.     Chest X-ray: This test produces images that allow simple examination of the heart and lungs.  o YOUR RESULTS: Normal

## 2022-12-30 NOTE — PROGRESS NOTES
"[facility] ENCOUNTER  12/30/22      REASON FOR VISIT  [facility]    PCP (Primary Care Provider)  I am Fco's PCP.    Fco appears in excellent health. He has healthy diet and lifestyle. Any chronic problems appear compensated/controlled and stable.    ASSESSMENT/PLAN  1. Preventative health care    2. Gastroesophageal reflux disease without esophagitis  Overview:  EGD WNL 3/9/2022    Orders:  -     pantoprazole (PROTONIX) 20 MG tablet; Take 1 tablet (20 mg total) by mouth every morning.  Dispense: 90 tablet; Refill: 3    3. Urinary hesitancy  Overview:  Urologist: Abhi Landaverde MD      4. Multiple nevi  Overview:  Dermatologist: Chelsea Bill PA-C      5. Traumatic arthritis of left foot  Overview:  Podiatrist: Claudia Mcdonald DPM      6. Labral tear of shoulder, left, sequela  Assessment & Plan:  Orthopedist: Ariel Winston MD      PHYSICAL EXAM  Vitals:    12/30/22 0814   BP: 116/75   BP Location: Right arm   Patient Position: Sitting   BP Method: Large (Manual)   Pulse: 72   Temp: 97.8 °F (36.6 °C)   SpO2: 99%   Weight: 69.9 kg (154 lb)   Height: 5' 10" (1.778 m)   CONSTITUTIONAL: No apparent distress. Normal appearance.   EYES: No scleral icterus. Conjunctivae unremarkable.  ENT: Right tympanic membrane, ear canal and external ear normal. Left tympanic membrane, ear canal and external ear normal.   NECK: No thyroid mass, thyromegaly or thyroid tenderness. No carotid bruit.   CARDIOVASCULAR: Normal rate and regular rhythm. Normal heart sounds.   PULMONARY: Pulmonary effort is normal. No respiratory distress. Normal breath sounds. No wheezing or rhonchi.   ABDOMINAL: Bowel sounds are normal. There is no distension. Abdomen is soft. There is no mass. There is no abdominal tenderness.   SKIN: Skin is warm and dry. Skin is not jaundiced.   NEUROLOGICAL: No focal deficit apparent. Alert, oriented to person, place, and time.   PSYCHIATRIC: Normal mood. Normal behavior. Judgment overtly " normal.     MEDICATIONS  Fco has a current medication list which includes the following prescription(s): pantoprazole.    HEALTH MAINTENANCE AND SCREENINGS - UP TO DATE  Health Maintenance Topics with due status: Not Due       Topic Last Completion Date    TETANUS VACCINE 01/01/2017    Lipid Panel 12/30/2022     HEALTH MAINTENANCE AND SCREENINGS - DUE OR DUE SOON  Health Maintenance Due   Topic Date Due    Hepatitis C Screening  Never done    COVID-19 Vaccine (3 - Booster for Pfizer series) 10/18/2021    Influenza Vaccine (1) 09/01/2022     FOLLOW-UP  Fco is to follow up with me for any health problems or concerns, any abnormal test results, and any age-appropriate health maintenance interventions and screenings that may be due.    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    PAST MEDICAL HISTORY  Fco has a past medical history of Gastroesophageal reflux disease without esophagitis and GERD (gastroesophageal reflux disease).    SURGICAL HISTORY  Fco has a past surgical history that includes Appendectomy; Tonsillectomy; and Esophagogastroduodenoscopy (N/A, 3/9/2022).    FAMILY HISTORY  Fco family history includes Cancer in his paternal grandmother; Heart attack in his father and maternal grandfather; Heart disease in his mother; Hypertrophic cardiomyopathy in his mother; Liver disease in his mother.     ALLERGIES  Fco reports he has No Known Allergies.    SOCIAL HISTORY  Fco  reports that he has never smoked. He has never used smokeless tobacco. He reports that he does not drink alcohol and does not use drugs.     Documentation entered by me for this encounter may have been done in part using speech-recognition technology. Although I have made an effort to ensure  "accuracy, "sound like" errors may exist and should be interpreted in context.  "

## 2022-12-30 NOTE — PROGRESS NOTES
Executive Health Screening    Fco Millan was seen 12/30/2022 for an executive health hearing screen.  Patient reports adding a device to his firearms that he uses for hunting that is supposed to reduce recoil. It made his gun louder than normal. When he shot it recently he experienced left ear pain. Was not wearing hearing protection.    Results reveal normal hearing from 250-8000 Hz for the right ear, and  mild hearing loss 250-8000 Hz for the left ear.     Otoscopy was unremarkable.    Recommendations:  Hearing protection in all noise. Recommended [facility].  Annual audiograms    Patient was counseled on the above findings.

## 2022-12-30 NOTE — PROGRESS NOTES
"Nutrition Assessment  Session Time:        Client name:  Fco Millan  :  1985  Age:  37 y.o.  Gender:  male    Client states:  [employer] employee present for annual physical.  Was last seen in 2021.  Reports trying to remain consistent with a balanced diet.  Continues with goal to maintain weight but prevent "soft" belly from forming.     Reports no changes to medical history since last physical.     Purchases [employer] to consume at work, but questions if those provide him enough calories.   Questions how many calories he should aim to consume.       2021: Client states:    [employer] employee present for annual physical as a requirement for his job.  Last physical was in 2020.  No questions or concerns today.   Maintaining weight since last year.  Reports being satisfied with weight, since this is where his body seems to want to be.  Reports diet is relatively balanced with healthy and less healthy food choices.  Not seeking any nutrition guidance today.     2020: Client states:  Very pleasant employee from [employer] here for his first annual wellness exam with [facility]. Today is his first appointment with a dietitian. Patient reports being on the thinner side his whole life. Patient wonders if he is considered a healthy weight or should he try to gain more. Attempts at weight gain in the past has led to patient gaining mainly belly fat and reports having a "soft" belly which he does not like. Patient asked question in regards to if he is consuming enough food on a daily basis. Does not keep a food diary to track his daily caloric intake, but estimates to consume approximately 400-500 calories per meal time and less for each snack. Told by physician he is slightly anemic and was recommended to take a daily iron supplement, but never did. Food and exercise history provided below.           " "      Anthropometrics  Height:  70"     Weight:  154 lbs   12-: 149 lbs   9-: 147 lbs  BMI:  22.10  % Body Fat:  n/a    Clinical Signs/Symptoms  N/V/D:  none noted  Appetite:  good       Past Medical History:   Diagnosis Date    Gastroesophageal reflux disease without esophagitis 3/11/2021    GERD (gastroesophageal reflux disease)        Past Surgical History:   Procedure Laterality Date    APPENDECTOMY      ESOPHAGOGASTRODUODENOSCOPY N/A 3/9/2022    Procedure: ESOPHAGOGASTRODUODENOSCOPY (EGD);  Surgeon: Sarahi West MD;  [facility];  Service: Endoscopy;  Laterality: N/A;    TONSILLECTOMY         Medications    has a current medication list which includes the following prescription(s): esomeprazole magnesium and ondansetron.    Vitamins, Minerals, and/or Supplements:  not discussed     Food/Medication Interactions:  Reviewed     Food Allergies or Intolerances:  NKFA     Social History    Marital status:    Employment:  [employer]    Social History     Tobacco Use    Smoking status: Never    Smokeless tobacco: Never   Substance Use Topics    Alcohol use: No     Alcohol/week: 0.0 standard drinks        Lab Reports   Sodium   Date Value Ref Range Status   12/27/2021 143 136 - 145 mmol/L Final     Potassium   Date Value Ref Range Status   12/27/2021 4.2 3.5 - 5.1 mmol/L Final     Chloride   Date Value Ref Range Status   12/27/2021 107 95 - 110 mmol/L Final     CO2   Date Value Ref Range Status   12/27/2021 28 23 - 29 mmol/L Final     Glucose   Date Value Ref Range Status   12/27/2021 93 70 - 110 mg/dL Final     BUN   Date Value Ref Range Status   12/27/2021 20 6 - 20 mg/dL Final     Creatinine   Date Value Ref Range Status   12/27/2021 1.0 0.5 - 1.4 mg/dL Final     Calcium   Date Value Ref Range Status   12/27/2021 9.3 8.7 - 10.5 mg/dL Final     Total Protein   Date Value Ref Range Status   12/27/2021 7.4 6.0 - 8.4 g/dL Final     Albumin   Date Value Ref Range Status   12/27/2021 4.2 3.5 - " 5.2 g/dL Final     Total Bilirubin   Date Value Ref Range Status   12/27/2021 0.8 0.1 - 1.0 mg/dL Final     Comment:     For infants and newborns, interpretation of results should be based  on gestational age, weight and in agreement with clinical  observations.    Premature Infant recommended reference ranges:  Up to 24 hours.............<8.0 mg/dL  Up to 48 hours............<12.0 mg/dL  3-5 days..................<15.0 mg/dL  6-29 days.................<15.0 mg/dL       Alkaline Phosphatase   Date Value Ref Range Status   12/27/2021 68 55 - 135 U/L Final     AST   Date Value Ref Range Status   12/27/2021 18 10 - 40 U/L Final     ALT   Date Value Ref Range Status   12/27/2021 19 10 - 44 U/L Final     Anion Gap   Date Value Ref Range Status   12/27/2021 8 8 - 16 mmol/L Final     eGFR if    Date Value Ref Range Status   12/27/2021 >60 >60 mL/min/1.73 m^2 Final     eGFR if non    Date Value Ref Range Status   12/27/2021 >60 >60 mL/min/1.73 m^2 Final     Comment:     Calculation used to obtain the estimated glomerular filtration  rate (eGFR) is the CKD-EPI equation.         Lab Results   Component Value Date    WBC 5.71 12/27/2021    HGB 14.0 12/27/2021    HCT 40.0 12/27/2021    MCV 90 12/27/2021     12/27/2021        Lab Results   Component Value Date    CHOL 185 12/27/2021     Lab Results   Component Value Date    HDL 53 12/27/2021     Lab Results   Component Value Date    LDLCALC 120.2 12/27/2021     Lab Results   Component Value Date    TRIG 59 12/27/2021     Lab Results   Component Value Date    CHOLHDL 28.6 12/27/2021     Lab Results   Component Value Date    HGBA1C 5.0 12/27/2021     BP Readings from Last 1 Encounters:   12/17/22 122/71       Food History  No recall.     Exercise History:  few times weekly    Cultural/Spiritual/Personal Preferences:  None identified    Support System:   none noted    State of Change:  Preparation    Barriers to Change:   none    Diagnosis    No nutrition diagnosis at this time.     Intervention    RMR (Method:  [place]. Jeor):  1629 kcal  Activity Factor:  1.3    RAÚL:  2117 kcal    Goals:  1.  4302-6999 calories daily for weight maintenance    2.  Include calorie dense foods in diet daily     Nutrition Education  The following education was provided to the patient:  Discussed weight management.  Suggested dietary modifications based on current dietary behaviors and individual food preferences.  Discussed nutrition-related lab values and dietary and/or lifestyle factors affecting them.    Patient verbalized understanding of nutrition education and recommendations received.    Handouts Provided  none    Monitoring/Evaluation    Monitor the following:  Weight  BMI  Caloric intake  Labs:  A1c, lipid panel    Follow Up Plan:  Communication with referring healthcare provider is unnecessary at this time as patient presented as part of annual wellness exam.  However, will follow up with patient in 1-2 years.

## 2023-01-04 LAB
LEAD BLD-MCNC: <1 MCG/DL
SPECIMEN SOURCE: NORMAL
STATE OF RESIDENCE: NORMAL

## 2023-04-03 ENCOUNTER — TELEPHONE (OUTPATIENT)
Dept: INTERNAL MEDICINE | Facility: CLINIC | Age: 38
End: 2023-04-03
Payer: OTHER GOVERNMENT

## 2023-04-03 ENCOUNTER — PATIENT MESSAGE (OUTPATIENT)
Dept: INTERNAL MEDICINE | Facility: CLINIC | Age: 38
End: 2023-04-03
Payer: OTHER GOVERNMENT

## 2023-04-24 ENCOUNTER — OFFICE VISIT (OUTPATIENT)
Dept: INTERNAL MEDICINE | Facility: CLINIC | Age: 38
End: 2023-04-24
Payer: OTHER GOVERNMENT

## 2023-04-24 DIAGNOSIS — G89.29 CHRONIC NECK PAIN: Primary | ICD-10-CM

## 2023-04-24 DIAGNOSIS — G44.82 HEADACHE ASSOCIATED WITH ORGASM: ICD-10-CM

## 2023-04-24 DIAGNOSIS — G44.82 HEADACHE ASSOCIATED WITH SEXUAL ACTIVITY: ICD-10-CM

## 2023-04-24 DIAGNOSIS — M47.812 SPONDYLOSIS OF CERVICAL REGION WITHOUT MYELOPATHY OR RADICULOPATHY: ICD-10-CM

## 2023-04-24 DIAGNOSIS — M54.2 CHRONIC NECK PAIN: Primary | ICD-10-CM

## 2023-04-24 PROCEDURE — 99214 PR OFFICE/OUTPT VISIT, EST, LEVL IV, 30-39 MIN: ICD-10-PCS | Mod: 95,,, | Performed by: FAMILY MEDICINE

## 2023-04-24 PROCEDURE — 99214 OFFICE O/P EST MOD 30 MIN: CPT | Mod: 95,,, | Performed by: FAMILY MEDICINE

## 2023-04-24 RX ORDER — NAPROXEN 375 MG/1
375 TABLET ORAL 2 TIMES DAILY PRN
Qty: 60 TABLET | Refills: 2 | Status: SHIPPED | OUTPATIENT
Start: 2023-04-24 | End: 2023-10-26

## 2023-04-24 NOTE — ASSESSMENT & PLAN NOTE
This is a chronic problem, with exacerbation or progression.   He reports chronic neck pain, on and off for years, but gradually getting worse, and persistent for most of the last couple of months. He describes it as an aching pain in his mid and upper neck with associated muscle tension extendiong laterally to his neck and upper medial shoulders. He denies radiating pain or numbness and tingling down his arms. He denies loss of strength. He says the most exacerbating factor is when he rides his riding lawnmower over rough terrain. Alleviating factors include Tylenol + Motrin. We discussed differential diagnosis and risks and benefits of treatment options.

## 2023-04-24 NOTE — PROGRESS NOTES
"TELEMEDICINE VIRTUAL VISIT  4/24/23  1:00 PM CDT    Visit Details: This visit was a telemedicine virtual visit with synchronous audio and video. Fco represented that his location at the time of this visit was in the [facility]. Fco chose and consented to receive these medical services by telemedicine.    Subjective   CHIEF COMPLAINT: Neck Pain and Headache    Refer to the history and data embedded within the "Assessment & Plan" section below for the status of the conditions evaluated and managed today.  Review of Systems   Constitutional:  Negative for activity change and unexpected weight change.   HENT:  Negative for hearing loss, rhinorrhea and trouble swallowing.    Eyes:  Negative for discharge and visual disturbance.   Respiratory:  Negative for chest tightness and wheezing.    Cardiovascular:  Negative for chest pain and palpitations.   Gastrointestinal:  Negative for blood in stool, constipation, diarrhea and vomiting.   Endocrine: Negative for polydipsia and polyuria.   Genitourinary:  Negative for difficulty urinating, hematuria and urgency.   Musculoskeletal:  Positive for neck pain. Negative for arthralgias and joint swelling.   Neurological:  Positive for headaches. Negative for vertigo, syncope, facial asymmetry, weakness, numbness, coordination difficulties and coordination difficulties.   Psychiatric/Behavioral:  Negative for confusion and dysphoric mood.        Assessment and Plan   1. Chronic neck pain  Assessment & Plan:  He reports chronic neck pain, on and off for years, but gradually getting worse, and persistent for most of the last couple of months. He describes it as an aching pain in his mid and upper neck with associated muscle tension extendiong laterally to his neck and upper medial shoulders. He denies radiating pain or numbness and tingling down his arms. He denies loss of strength. He says the most exacerbating factor is when he rides his riding lawnmower over rough terrain. " "Alleviating factors include Tylenol + Motrin. We discussed differential diagnosis and risks and benefits of treatment options.    Orders:  -     Ambulatory referral/consult to Physical/Occupational Therapy; Future; Expected date: 05/01/2023  -     X-Ray Cervical Spine 2 or 3 Views; Future; Expected date: 04/24/2023  -     naproxen (NAPROSYN) 375 MG tablet; Take 1 tablet (375 mg total) by mouth 2 (two) times daily as needed (for neck pain).  Dispense: 60 tablet; Refill: 2    2. Spondylosis of cervical region without myelopathy or radiculopathy  -     Ambulatory referral/consult to Physical/Occupational Therapy; Future; Expected date: 05/01/2023  -     X-Ray Cervical Spine 2 or 3 Views; Future; Expected date: 04/24/2023  -     naproxen (NAPROSYN) 375 MG tablet; Take 1 tablet (375 mg total) by mouth 2 (two) times daily as needed (for neck pain).  Dispense: 60 tablet; Refill: 2    3. Headache associated with orgasm  Assessment & Plan:  He reports he has been experiencing an intense headache, which he describes as an "explosion" of severe pain in his head that occurs suddenly just prior to or at the moment of orgasm. The severe pain typically lasts for around 15 minutes, and is followed by a persistent throbbing head pain that gradually resolves over 15 minutes to an hour. It has been going on for a while and gradually becoming more frequent. We discussed differential diagnosis and risks and benefits of treatment options.    Orders:  -     CTA Head; Future; Expected date: 04/24/2023    4. Headache associated with sexual activity  -     CTA Head; Future; Expected date: 04/24/2023    Unless noted herein, any chronic conditions are represented as and appear stable, and no other significant complaints or concerns were reported.    Follow up in about 2 months (around 6/24/2023) for office visit, review test results, discuss treatment plan, re-evaluation.   Future Appointments   Date Time Provider Department Center   8/8/2023 " 11:20 AM EFRAIN Zamudio MD [facility]           Objective   Physical Exam  Constitutional:       General: He is not in acute distress.     Appearance: Normal appearance. He is not ill-appearing.   Pulmonary:      Effort: Pulmonary effort is normal. No respiratory distress.   Skin:     Coloration: Skin is not jaundiced.   Neurological:      General: No focal deficit present.      Mental Status: He is alert and oriented to person, place, and time. Mental status is at baseline.   Psychiatric:         Mood and Affect: Mood normal.         Behavior: Behavior normal.         Thought Content: Thought content normal.         Judgment: Judgment normal.      Orders Placed This Encounter    X-Ray Cervical Spine 2 or 3 Views    CTA Head    Ambulatory referral/consult to Physical/Occupational Therapy    naproxen (NAPROSYN) 375 MG tablet     TOTAL TIME evaluating and managing this patient for this encounter was greater than or equal to 35 minutes. This time was spent personally by me on the following activities: pre-charting, review of patient's past medical history, assessing age-appropriate health maintenance needs, review of any interval history, medication reconciliation, obtaining history from the patient, examination of the patient, managing and/or ordering prescription medications, ordering imaging tests, ordering other treatments, discussing differential diagnosis with patient, educating patient and answering their questions about risks and benefits of treatment options, educating patient and answering their questions about treatment plan, goals of treatment, and follow-up, and final documentation of the visit. This time was exclusive of any separately billable procedures for this patient and exclusive of time spent treating any other patients.    Documentation entered by me for this encounter may have been done in part using speech-recognition technology. Although I have made an effort to ensure accuracy,  ""sound like" errors may exist and should be interpreted in context.    Each patient to whom medical services are provided by telemedicine is: (1) informed of the relationship between the physician and patient and the respective role of any other health care provider with respect to management of the patient; and (2) notified that he or she may decline to receive medical services by telemedicine and may withdraw from such care at any time.  "

## 2023-04-24 NOTE — ASSESSMENT & PLAN NOTE
"This is a new problem, undiagnosed, uncertain prognosis.   He reports he has been experiencing an intense headache, which he describes as an "explosion" of severe pain in his head that occurs suddenly just prior to or at the moment of orgasm. The severe pain typically lasts for around 15 minutes, and is followed by a persistent throbbing head pain that gradually resolves over 15 minutes to an hour. It has been going on for a while and gradually becoming more frequent. We discussed differential diagnosis and risks and benefits of treatment options.  "

## 2023-04-26 ENCOUNTER — HOSPITAL ENCOUNTER (OUTPATIENT)
Dept: RADIOLOGY | Facility: HOSPITAL | Age: 38
Discharge: HOME | End: 2023-04-26
Attending: FAMILY MEDICINE
Payer: OTHER GOVERNMENT

## 2023-04-26 DIAGNOSIS — M47.812 SPONDYLOSIS OF CERVICAL REGION WITHOUT MYELOPATHY OR RADICULOPATHY: ICD-10-CM

## 2023-04-26 DIAGNOSIS — M54.2 CHRONIC NECK PAIN: ICD-10-CM

## 2023-04-26 DIAGNOSIS — G89.29 CHRONIC NECK PAIN: ICD-10-CM

## 2023-04-26 PROCEDURE — 72040 X-RAY EXAM NECK SPINE 2-3 VW: CPT | Mod: TC,FY,PO

## 2023-04-26 PROCEDURE — 72040 X-RAY EXAM NECK SPINE 2-3 VW: CPT | Mod: 26,,, | Performed by: RADIOLOGY

## 2023-04-26 PROCEDURE — 72040 XR CERVICAL SPINE 2 OR 3 VIEWS: ICD-10-PCS | Mod: 26,,, | Performed by: RADIOLOGY

## 2023-04-27 ENCOUNTER — HOSPITAL ENCOUNTER (OUTPATIENT)
Dept: RADIOLOGY | Facility: HOSPITAL | Age: 38
Discharge: HOME | End: 2023-04-27
Attending: FAMILY MEDICINE
Payer: OTHER GOVERNMENT

## 2023-04-27 DIAGNOSIS — G44.82 HEADACHE ASSOCIATED WITH SEXUAL ACTIVITY: ICD-10-CM

## 2023-04-27 DIAGNOSIS — G44.82 HEADACHE ASSOCIATED WITH ORGASM: ICD-10-CM

## 2023-04-27 PROCEDURE — 70496 CT ANGIOGRAPHY HEAD: CPT | Mod: 26,,, | Performed by: RADIOLOGY

## 2023-04-27 PROCEDURE — 70496 CTA HEAD: ICD-10-PCS | Mod: 26,,, | Performed by: RADIOLOGY

## 2023-04-27 PROCEDURE — 70496 CT ANGIOGRAPHY HEAD: CPT | Mod: TC,PN

## 2023-04-27 PROCEDURE — 25500020 PHARM REV CODE 255: Mod: PN | Performed by: FAMILY MEDICINE

## 2023-04-27 RX ADMIN — IOHEXOL 100 ML: 350 INJECTION, SOLUTION INTRAVENOUS at 09:04

## 2023-10-12 DIAGNOSIS — Z00.00 ROUTINE GENERAL MEDICAL EXAMINATION AT A HEALTH CARE FACILITY: Primary | ICD-10-CM

## 2023-10-26 ENCOUNTER — CLINICAL SUPPORT (OUTPATIENT)
Dept: PULMONOLOGY | Facility: CLINIC | Age: 38
End: 2023-10-26

## 2023-10-26 ENCOUNTER — CLINICAL SUPPORT (OUTPATIENT)
Dept: INTERNAL MEDICINE | Facility: CLINIC | Age: 38
End: 2023-10-26

## 2023-10-26 ENCOUNTER — OFFICE VISIT (OUTPATIENT)
Dept: INTERNAL MEDICINE | Facility: CLINIC | Age: 38
End: 2023-10-26

## 2023-10-26 ENCOUNTER — CLINICAL SUPPORT (OUTPATIENT)
Dept: INTERNAL MEDICINE | Facility: CLINIC | Age: 38
End: 2023-10-26
Payer: OTHER GOVERNMENT

## 2023-10-26 ENCOUNTER — CLINICAL SUPPORT (OUTPATIENT)
Dept: AUDIOLOGY | Facility: CLINIC | Age: 38
End: 2023-10-26

## 2023-10-26 ENCOUNTER — HOSPITAL ENCOUNTER (OUTPATIENT)
Dept: RADIOLOGY | Facility: HOSPITAL | Age: 38
Discharge: HOME | End: 2023-10-26
Attending: FAMILY MEDICINE

## 2023-10-26 VITALS
HEIGHT: 70 IN | DIASTOLIC BLOOD PRESSURE: 63 MMHG | TEMPERATURE: 98 F | BODY MASS INDEX: 22.41 KG/M2 | HEART RATE: 63 BPM | WEIGHT: 156.5 LBS | SYSTOLIC BLOOD PRESSURE: 102 MMHG

## 2023-10-26 DIAGNOSIS — Z00.00 ROUTINE GENERAL MEDICAL EXAMINATION AT A HEALTH CARE FACILITY: ICD-10-CM

## 2023-10-26 DIAGNOSIS — Z01.12 HEARING CONSERVATION AND TREATMENT EXAM: Primary | ICD-10-CM

## 2023-10-26 DIAGNOSIS — D22.9 MULTIPLE NEVI: Chronic | ICD-10-CM

## 2023-10-26 DIAGNOSIS — K21.9 GASTROESOPHAGEAL REFLUX DISEASE WITHOUT ESOPHAGITIS: Chronic | ICD-10-CM

## 2023-10-26 DIAGNOSIS — Z71.3 DIETARY COUNSELING: Primary | ICD-10-CM

## 2023-10-26 DIAGNOSIS — L72.9 SUBCUTANEOUS CYST: ICD-10-CM

## 2023-10-26 DIAGNOSIS — R68.82 LOW LIBIDO: Chronic | ICD-10-CM

## 2023-10-26 DIAGNOSIS — Z00.00 PREVENTATIVE HEALTH CARE: Primary | ICD-10-CM

## 2023-10-26 DIAGNOSIS — Z00.00 ROUTINE GENERAL MEDICAL EXAMINATION AT A HEALTH CARE FACILITY: Primary | ICD-10-CM

## 2023-10-26 PROBLEM — G44.82 HEADACHE ASSOCIATED WITH ORGASM: Chronic | Status: RESOLVED | Noted: 2023-04-24 | Resolved: 2023-10-26

## 2023-10-26 PROBLEM — H91.90 MILD HEARING LOSS: Chronic | Status: ACTIVE | Noted: 2021-12-31

## 2023-10-26 LAB
ALBUMIN SERPL BCP-MCNC: 4.3 G/DL (ref 3.5–5.2)
ALP SERPL-CCNC: 61 U/L (ref 55–135)
ALT SERPL W/O P-5'-P-CCNC: 15 U/L (ref 10–44)
ANION GAP SERPL CALC-SCNC: 11 MMOL/L (ref 8–16)
AST SERPL-CCNC: 17 U/L (ref 10–40)
BILIRUB SERPL-MCNC: 0.7 MG/DL (ref 0.1–1)
BILIRUB UR QL STRIP: NEGATIVE
BRPFT: NORMAL
BUN SERPL-MCNC: 22 MG/DL (ref 6–20)
CALCIUM SERPL-MCNC: 9.4 MG/DL (ref 8.7–10.5)
CHLORIDE SERPL-SCNC: 107 MMOL/L (ref 95–110)
CHOLEST SERPL-MCNC: 162 MG/DL (ref 120–199)
CHOLEST/HDLC SERPL: 3.7 {RATIO} (ref 2–5)
CLARITY UR: CLEAR
CO2 SERPL-SCNC: 27 MMOL/L (ref 23–29)
COLOR UR: YELLOW
CREAT SERPL-MCNC: 1 MG/DL (ref 0.5–1.4)
ERYTHROCYTE [DISTWIDTH] IN BLOOD BY AUTOMATED COUNT: 11.2 % (ref 11.5–14.5)
EST. GFR  (NO RACE VARIABLE): >60 ML/MIN/1.73 M^2
ESTIMATED AVG GLUCOSE: 91 MG/DL (ref 68–131)
FEF 25 75 LLN: 2.43
FEF 25 75 PRE REF: 64.9 %
FEF 25 75 REF: 4.14
FEV1 FVC LLN: 70
FEV1 FVC PRE REF: 94.8 %
FEV1 FVC REF: 81
FEV1 LLN: 3.37
FEV1 PRE REF: 76.2 %
FEV1 REF: 4.25
FVC LLN: 4.2
FVC PRE REF: 80 %
FVC REF: 5.28
GLUCOSE SERPL-MCNC: 89 MG/DL (ref 70–110)
GLUCOSE UR QL STRIP: NEGATIVE
HBA1C MFR BLD: 4.8 % (ref 4–5.6)
HCT VFR BLD AUTO: 40 % (ref 40–54)
HDLC SERPL-MCNC: 44 MG/DL (ref 40–75)
HDLC SERPL: 27.2 % (ref 20–50)
HGB BLD-MCNC: 14 G/DL (ref 14–18)
HGB UR QL STRIP: NEGATIVE
KETONES UR QL STRIP: NEGATIVE
LDLC SERPL CALC-MCNC: 103.6 MG/DL (ref 63–159)
LEUKOCYTE ESTERASE UR QL STRIP: NEGATIVE
MCH RBC QN AUTO: 31.7 PG (ref 27–31)
MCHC RBC AUTO-ENTMCNC: 35 G/DL (ref 32–36)
MCV RBC AUTO: 91 FL (ref 82–98)
NITRITE UR QL STRIP: NEGATIVE
NONHDLC SERPL-MCNC: 118 MG/DL
PEF LLN: 7.89
PEF PRE REF: 86.2 %
PEF REF: 10.21
PH UR STRIP: 7 [PH] (ref 5–8)
PLATELET # BLD AUTO: 196 K/UL (ref 150–450)
PMV BLD AUTO: 9.2 FL (ref 9.2–12.9)
POTASSIUM SERPL-SCNC: 4.2 MMOL/L (ref 3.5–5.1)
PRE FEF 25 75: 2.69 L/S
PRE FET 100: 5.39 SEC
PRE FEV1 FVC: 76.69 %
PRE FEV1: 3.24 L
PRE FVC: 4.22 L
PRE PEF: 8.8 L/S
PROT SERPL-MCNC: 7.4 G/DL (ref 6–8.4)
PROT UR QL STRIP: NEGATIVE
RBC # BLD AUTO: 4.41 M/UL (ref 4.6–6.2)
SODIUM SERPL-SCNC: 145 MMOL/L (ref 136–145)
SP GR UR STRIP: 1.02 (ref 1–1.03)
TESTOST SERPL-MCNC: 622 NG/DL (ref 304–1227)
TRIGL SERPL-MCNC: 72 MG/DL (ref 30–150)
URN SPEC COLLECT METH UR: NORMAL
UROBILINOGEN UR STRIP-ACNC: NORMAL MG/DL
WBC # BLD AUTO: 4.71 K/UL (ref 3.9–12.7)

## 2023-10-26 PROCEDURE — 97802 PR MED NUTR THER, 1ST, INDIV, EA 15 MIN: ICD-10-PCS | Mod: S$GLB,,, | Performed by: DIETITIAN, REGISTERED

## 2023-10-26 PROCEDURE — 71046 XR CHEST PA AND LATERAL: ICD-10-PCS | Mod: 26,,, | Performed by: RADIOLOGY

## 2023-10-26 PROCEDURE — 94010 BREATHING CAPACITY TEST: ICD-10-PCS | Mod: S$GLB,,, | Performed by: INTERNAL MEDICINE

## 2023-10-26 PROCEDURE — 99395 PR PREVENTIVE VISIT,EST,18-39: ICD-10-PCS | Mod: S$GLB,,, | Performed by: FAMILY MEDICINE

## 2023-10-26 PROCEDURE — 92552 PURE TONE AUDIOMETRY AIR: CPT | Mod: S$GLB,,, | Performed by: AUDIOLOGIST

## 2023-10-26 PROCEDURE — 84403 ASSAY OF TOTAL TESTOSTERONE: CPT | Performed by: FAMILY MEDICINE

## 2023-10-26 PROCEDURE — 99199 PR SPECIAL SERVICE/PROC/REPORT: ICD-10-PCS | Mod: ,,, | Performed by: DIETITIAN, REGISTERED

## 2023-10-26 PROCEDURE — 85027 COMPLETE CBC AUTOMATED: CPT | Performed by: FAMILY MEDICINE

## 2023-10-26 PROCEDURE — 71046 X-RAY EXAM CHEST 2 VIEWS: CPT | Mod: 26,,, | Performed by: RADIOLOGY

## 2023-10-26 PROCEDURE — 94010 BREATHING CAPACITY TEST: CPT | Mod: S$GLB,,, | Performed by: INTERNAL MEDICINE

## 2023-10-26 PROCEDURE — 97802 MEDICAL NUTRITION INDIV IN: CPT | Mod: S$GLB,,, | Performed by: DIETITIAN, REGISTERED

## 2023-10-26 PROCEDURE — 83036 HEMOGLOBIN GLYCOSYLATED A1C: CPT | Performed by: FAMILY MEDICINE

## 2023-10-26 PROCEDURE — 99999 PR PBB SHADOW E&M-EST. PATIENT-LVL III: ICD-10-PCS | Mod: PBBFAC,,, | Performed by: FAMILY MEDICINE

## 2023-10-26 PROCEDURE — 81003 URINALYSIS AUTO W/O SCOPE: CPT | Performed by: FAMILY MEDICINE

## 2023-10-26 PROCEDURE — 83655 ASSAY OF LEAD: CPT | Performed by: FAMILY MEDICINE

## 2023-10-26 PROCEDURE — 99999 PR PBB SHADOW E&M-EST. PATIENT-LVL III: CPT | Mod: PBBFAC,,, | Performed by: FAMILY MEDICINE

## 2023-10-26 PROCEDURE — 92552 PR PURE TONE AUDIOMETRY, AIR: ICD-10-PCS | Mod: S$GLB,,, | Performed by: AUDIOLOGIST

## 2023-10-26 PROCEDURE — 80061 LIPID PANEL: CPT | Performed by: FAMILY MEDICINE

## 2023-10-26 PROCEDURE — 71046 X-RAY EXAM CHEST 2 VIEWS: CPT | Mod: TC

## 2023-10-26 PROCEDURE — 99199 UNLISTED SPECIAL SVC PX/RPRT: CPT | Mod: ,,, | Performed by: DIETITIAN, REGISTERED

## 2023-10-26 PROCEDURE — 99999 PR PBB SHADOW E&M-EST. PATIENT-LVL I: ICD-10-PCS | Mod: PBBFAC,,, | Performed by: AUDIOLOGIST

## 2023-10-26 PROCEDURE — 99999 PR PBB SHADOW E&M-EST. PATIENT-LVL I: CPT | Mod: PBBFAC,,, | Performed by: AUDIOLOGIST

## 2023-10-26 PROCEDURE — 80053 COMPREHEN METABOLIC PANEL: CPT | Performed by: FAMILY MEDICINE

## 2023-10-26 PROCEDURE — 99395 PREV VISIT EST AGE 18-39: CPT | Mod: S$GLB,,, | Performed by: FAMILY MEDICINE

## 2023-10-26 RX ORDER — PANTOPRAZOLE SODIUM 20 MG/1
20 TABLET, DELAYED RELEASE ORAL EVERY MORNING
Qty: 90 TABLET | Refills: 3 | Status: SHIPPED | OUTPATIENT
Start: 2023-10-26 | End: 2024-10-25

## 2023-10-26 NOTE — PROGRESS NOTES
Executive Health Screening    Fco Millan was seen 10/26/2023 for an executive health hearing screen.      Otoscopy was unremarkable in both ears. Results revealed a normal hearing 250-8000 Hz for the right ear, and  mild hearing loss 3000 & 8000 Hz for the left ear.     Patient was counseled on the above findings.    Recommendations:   1.   Consistent use of hearing protective devices when around loud noise.   2.   Annual hearing screenings.

## 2023-10-26 NOTE — PROGRESS NOTES
[facility] ENCOUNTER  10/26/23      REASON FOR VISIT  [facility]    PCP (Primary Care Provider)  I am Fco's PCP.    HISTORY  Fco comes in for his [facility] Exam. He continues to take good care of his health, exercising regularly and eating healthily. His blood pressure and body mass index are both excellent. His only medication is pantoprazole, which controls his gastroesophageal reflux disease well when he takes it. I encouraged him to take it consistently. He is noted to have a small, nontender, semi-mobile subcutaneous cyst approximately 1 cm in maximum diameter, located on his anterior thigh. He says it has been there for years and it is not changing or growing. He says it does not bother him enough to pursue further evaluation or treatment, such as consideration for excision. He will let me know if he changes his mind. He has multiple benign-appearing nevi, and he says he gets annual dermatology skin cancer screenings for this. He reports decreased libido over the last few months and requests testing for testosterone levels. He says the hearing loss is stable. No other new complaints or concerns were reported. He needs a flu shot, but we are out. He dana he would get it through his work.      Review of Systems   Respiratory:  Negative for chest tightness and shortness of breath.    Cardiovascular:  Negative for chest pain.   Endocrine: Negative for polydipsia and polyuria.       ASSESSMENT/PLAN  1. Preventative health care    2. Gastroesophageal reflux disease without esophagitis  Overview:  EGD [facility] 3/9/2022    Orders:  -     pantoprazole (PROTONIX) 20 MG tablet; Take 1 tablet (20 mg total) by mouth every morning.  Dispense: 90 tablet; Refill: 3    3. Low libido  Assessment & Plan:  He requests testosterone level checked.    Orders:  -     Testosterone; Future; Expected date: 10/26/2023    4. Multiple nevi  Overview:  Dermatologist: Chelsea Bill PA-C      5. Subcutaneous cyst left  "anterior thigh        PHYSICAL EXAM  Vitals:    10/26/23 0854   BP: 102/63   Pulse: 63   Temp: 97.5 °F (36.4 °C)   Weight: 71 kg (156 lb 8.4 oz)   Height: 5' 10" (1.778 m)   Physical Exam  Vitals reviewed.   Constitutional:       General: He is not in acute distress.     Appearance: Normal appearance. He is not ill-appearing, toxic-appearing or diaphoretic.   HENT:      Head: Normocephalic and atraumatic.      Right Ear: Tympanic membrane, ear canal and external ear normal.      Left Ear: Tympanic membrane, ear canal and external ear normal.      Ears:      Comments: Hearing grossly intact.  Eyes:      General: No scleral icterus.     Conjunctiva/sclera: Conjunctivae normal.   Neck:      Vascular: No carotid bruit.   Cardiovascular:      Rate and Rhythm: Normal rate and regular rhythm.      Heart sounds: Normal heart sounds.   Pulmonary:      Effort: Pulmonary effort is normal.      Breath sounds: Normal breath sounds.   Abdominal:      General: Bowel sounds are normal. There is no distension.      Palpations: Abdomen is soft. There is no mass.      Tenderness: There is no abdominal tenderness.   Musculoskeletal:         General: No tenderness.      Cervical back: No muscular tenderness.   Lymphadenopathy:      Cervical: No cervical adenopathy.   Skin:     General: Skin is warm and dry.      Coloration: Skin is not jaundiced.   Neurological:      General: No focal deficit present.      Mental Status: He is alert and oriented to person, place, and time.      Cranial Nerves: No cranial nerve deficit.      Gait: Gait normal.   Psychiatric:         Mood and Affect: Mood normal.         Behavior: Behavior normal.         Judgment: Judgment normal.         MEDICATIONS  Fco has a current medication list which includes the following prescription(s): pantoprazole.    HEALTH MAINTENANCE AND SCREENINGS - UP TO DATE  Health Maintenance Topics with due status: Not Due       Topic Last Completion Date    TETANUS VACCINE " "01/01/2017    Lipid Panel 10/26/2023     HEALTH MAINTENANCE AND SCREENINGS - DUE OR DUE SOON  Health Maintenance Due   Topic Date Due    Hepatitis C Screening  Never done    Influenza Vaccine (1) 09/01/2023    COVID-19 Vaccine (3 - 2023-24 season) 09/01/2023     FOLLOW-UP  Fco is to follow up with me for any health problems or concerns, any abnormal test results, and any age-appropriate health maintenance interventions and screenings that may be due.    PROBLEM LIST  Fco has Frequent PVCs; Family history of Cotto's esophagus; Gastroesophageal reflux disease without esophagitis; Urinary hesitancy; Labral tear of shoulder, left, sequela; Traumatic arthritis of left foot; Multiple nevi; Mild bilateral hearing loss; Chronic neck pain; Low libido; and Subcutaneous cyst left anterior thigh on their problem list.    PAST MEDICAL HISTORY  Fco has a past medical history of Chronic neck pain, Gastroesophageal reflux disease without esophagitis, and GERD (gastroesophageal reflux disease).    SURGICAL HISTORY  Fco has a past surgical history that includes Appendectomy; Tonsillectomy; and Esophagogastroduodenoscopy (N/A, 3/9/2022).    FAMILY HISTORY  Fco family history includes Cancer in his paternal grandmother; Heart attack in his father and maternal grandfather; Heart disease in his mother; Hypertrophic cardiomyopathy in his mother; Liver disease in his mother.     ALLERGIES  Fco reports he has No Known Allergies.    SOCIAL HISTORY  Fco  reports that he has never smoked. He has never used smokeless tobacco. He reports that he does not drink alcohol and does not use drugs.     Documentation entered by me for this encounter may have been done in part using speech-recognition technology. Although I have made an effort to ensure accuracy, "sound like" errors may exist and should be interpreted in context.    "

## 2023-10-26 NOTE — PROGRESS NOTES
"Nutrition Assessment  Session Time:  30 minutes      Client name:  Fco Millan  :  1985  Age:  38 y.o.  Gender:  male    Client states:  [employer] employee present for annual physical.  Last physical and nutrition consultation: 2022    Reports maintaining weight but belly area is softer than he would like.  Question if his nighttime snack of wheat bread + smoked turkey needs to be changes.     Currently exercising 3x/week. Current routine consists of a 10-15 minute run as a warmup follow by light weight training.   In general is moving around less with daily activities than he has in the past.  Questions if exercise should be changes.     Daily drink choice consists of water.   Will drink 1-2 alcohol beverages once per week.     Limits fried food intake.  Limits fast food intake.   May have some cookie dough every now and then if it is in the house.     Sample day:  Breakfast: eggs with olive oil + toast  Snack: protein shake (low sugar option from [employer])  Lunch: 6" [employer] sandwich  Dinner: food prepared at station (reports options could be better)  Night snack: whole wheat bread + smoked turkey         Anthropometrics  Height:  70"     Weight:  156 lbs  BMI:  22.46  % Body Fat:  n/a    Clinical Signs/Symptoms  N/V/D:  none noted  Appetite:  good       Past Medical History:   Diagnosis Date    Chronic neck pain 2023    Gastroesophageal reflux disease without esophagitis 3/11/2021    GERD (gastroesophageal reflux disease)        Past Surgical History:   Procedure Laterality Date    APPENDECTOMY      ESOPHAGOGASTRODUODENOSCOPY N/A 3/9/2022    Procedure: ESOPHAGOGASTRODUODENOSCOPY (EGD);  Surgeon: Sarahi West MD;  Location: [facility];  Service: Endoscopy;  Laterality: N/A;    TONSILLECTOMY         Medications    has a current medication list which includes the following prescription(s): naproxen and pantoprazole.    Vitamins, Minerals, and/or Supplements:  " not discussed     Food/Medication Interactions:  Reviewed     Food Allergies or Intolerances:  NKFA noted in chart     Social History    Marital status:    Employment:  yes    Social History     Tobacco Use    Smoking status: Never    Smokeless tobacco: Never   Substance Use Topics    Alcohol use: No     Alcohol/week: 0.0 standard drinks of alcohol        Lab Reports   Sodium   Date Value Ref Range Status   12/30/2022 143 136 - 145 mmol/L Final     Potassium   Date Value Ref Range Status   12/30/2022 4.5 3.5 - 5.1 mmol/L Final     Chloride   Date Value Ref Range Status   12/30/2022 106 95 - 110 mmol/L Final     CO2   Date Value Ref Range Status   12/30/2022 29 23 - 29 mmol/L Final     Glucose   Date Value Ref Range Status   12/30/2022 89 70 - 110 mg/dL Final     BUN   Date Value Ref Range Status   12/30/2022 17 6 - 20 mg/dL Final     Creatinine   Date Value Ref Range Status   12/30/2022 1.0 0.5 - 1.4 mg/dL Final     Calcium   Date Value Ref Range Status   12/30/2022 9.4 8.7 - 10.5 mg/dL Final     Total Protein   Date Value Ref Range Status   12/30/2022 7.1 6.0 - 8.4 g/dL Final     Albumin   Date Value Ref Range Status   12/30/2022 4.2 3.5 - 5.2 g/dL Final     Total Bilirubin   Date Value Ref Range Status   12/30/2022 0.7 0.1 - 1.0 mg/dL Final     Comment:     For infants and newborns, interpretation of results should be based  on gestational age, weight and in agreement with clinical  observations.    Premature Infant recommended reference ranges:  Up to 24 hours.............<8.0 mg/dL  Up to 48 hours............<12.0 mg/dL  3-5 days..................<15.0 mg/dL  6-29 days.................<15.0 mg/dL       Alkaline Phosphatase   Date Value Ref Range Status   12/30/2022 68 55 - 135 U/L Final     AST   Date Value Ref Range Status   12/30/2022 20 10 - 40 U/L Final     ALT   Date Value Ref Range Status   12/30/2022 31 10 - 44 U/L Final     Anion Gap   Date Value Ref Range Status   12/30/2022 8 8 - 16 mmol/L Final      eGFR if    Date Value Ref Range Status   12/27/2021 >60 >60 mL/min/1.73 m^2 Final     eGFR if non    Date Value Ref Range Status   12/27/2021 >60 >60 mL/min/1.73 m^2 Final     Comment:     Calculation used to obtain the estimated glomerular filtration  rate (eGFR) is the CKD-EPI equation.         Lab Results   Component Value Date    WBC 5.38 12/30/2022    HGB 14.7 12/30/2022    HCT 42.1 12/30/2022    MCV 91 12/30/2022     12/30/2022        Lab Results   Component Value Date    CHOL 188 12/30/2022     Lab Results   Component Value Date    HDL 46 12/30/2022     Lab Results   Component Value Date    LDLCALC 128.6 12/30/2022     Lab Results   Component Value Date    TRIG 67 12/30/2022     Lab Results   Component Value Date    CHOLHDL 24.5 12/30/2022     Lab Results   Component Value Date    HGBA1C 4.9 12/30/2022     BP Readings from Last 1 Encounters:   12/30/22 116/75       Food History  Provided above    Exercise History:  provided above    Cultural/Spiritual/Personal Preferences:  None identified    Support System:  spouse    State of Change:  Contemplation    Barriers to Change:  none    Diagnosis    No nutrition diagnosis at this time.        Intervention    RMR (Method:  [place] Jeor):  1633 kcal  Activity Factor:  1.3    RAÚL:  2122 kcal    Goals:  1.  Increase cardio exercise and/or daily movement.   2.  Replace starchy carbohydrates with complex sources.       Nutrition Education  The following education was provided to the patient:  Discussed weight management.  Suggested dietary modifications based on current dietary behaviors and individual food preferences.    Patient verbalized understanding of nutrition education and recommendations received.    Handouts Provided  none    Monitoring/Evaluation    Monitor the following:  Weight  BMI  Caloric intake  Labs:  lipid panel, glucose, HgbA1c    Follow Up Plan:  Communication with referring healthcare provider is  unnecessary at this time as patient presented as part of annual wellness exam.  However, will follow up with patient in 1-2 years.

## 2023-10-26 NOTE — LETTER
Dear Fco,    I enjoyed seeing you again at your recent [facility] exam.    This letter and the accompanying reports will summarize your medical history, physical exam findings, and test results.    Please send me a [facility] Portal message if I can answer any questions.    Thanks for letting me care for you and trusting [facility] with your healthcare needs.    All the best,     OLGA Zamudio MD     ENCLOSURES       Test Results Summary for Fco Millan,  1985       CBC: The complete blood count (CBC) checks for anemia and measures your blood's red blood cells, white blood cells, and platelets.  o YOUR RESULTS: Normal or at least acceptable. No further evaluation or treatment is needed at this point.     CMP: The comprehensive metabolic panel (CMP) checks kidney function, liver function, sodium, potassium, glucose (sugar), and other aspects of your metabolism.  o YOUR RESULTS: Essentially normal     Lipid Panel: The lipid panel measures the levels of different fatty substances in your blood (cholesterol and triglycerides) that can contribute to plaque buildup in your arteries (atherosclerosis).  o YOUR RESULTS: Normal     A1c: The hemoglobin A1c (A1c) is a test that evaluates and screens for diabetes.  o YOUR RESULTS: Normal     Serum Lead Level:  A measure of the amount of lead (a toxic heavy metal) in your blood.  o YOUR RESULTS: Normal     Urinalysis: A test that examines urine. The test can be chemical, microscopic, or both.  o YOUR RESULTS: Normal     Testosterone: Testosterone is the principal male sex hormone (androgen) that is produced by testicles. Testosterone encourages growth of bone and muscle, and helps maintain muscle strength.  o YOUR RESULTS: Normal     Chest X-ray: This test produces images that allow simple examination of the heart and lungs.  o YOUR RESULTS: Normal

## 2023-10-28 LAB
CITY: NORMAL
COUNTY: NORMAL
GUARDIAN FIRST NAME: NORMAL
GUARDIAN LAST NAME: NORMAL
LEAD BLD-MCNC: <1 MCG/DL
PHONE #: NORMAL
POSTAL CODE: NORMAL
RACE: NORMAL
STATE OF RESIDENCE: NORMAL
STREET ADDRESS: NORMAL

## 2023-11-15 ENCOUNTER — OFFICE VISIT (OUTPATIENT)
Dept: DERMATOLOGY | Facility: CLINIC | Age: 38
End: 2023-11-15
Payer: OTHER GOVERNMENT

## 2023-11-15 DIAGNOSIS — L57.0 ACTINIC KERATOSIS: ICD-10-CM

## 2023-11-15 DIAGNOSIS — D22.9 MULTIPLE NEVI: Primary | ICD-10-CM

## 2023-11-15 DIAGNOSIS — L82.1 SEBORRHEIC KERATOSIS: ICD-10-CM

## 2023-11-15 DIAGNOSIS — D18.00 HEMANGIOMA, UNSPECIFIED SITE: ICD-10-CM

## 2023-11-15 DIAGNOSIS — L30.9 DERMATITIS: ICD-10-CM

## 2023-11-15 PROCEDURE — 99214 PR OFFICE/OUTPT VISIT, EST, LEVL IV, 30-39 MIN: ICD-10-PCS | Mod: 25,S$PBB,, | Performed by: PHYSICIAN ASSISTANT

## 2023-11-15 PROCEDURE — 17000 PR DESTRUCTION(LASER SURGERY,CRYOSURGERY,CHEMOSURGERY),PREMALIGNANT LESIONS,FIRST LESION: ICD-10-PCS | Mod: S$PBB,,, | Performed by: PHYSICIAN ASSISTANT

## 2023-11-15 PROCEDURE — 99213 OFFICE O/P EST LOW 20 MIN: CPT | Mod: PBBFAC,25,PO | Performed by: PHYSICIAN ASSISTANT

## 2023-11-15 PROCEDURE — 99214 OFFICE O/P EST MOD 30 MIN: CPT | Mod: 25,S$PBB,, | Performed by: PHYSICIAN ASSISTANT

## 2023-11-15 PROCEDURE — 99999 PR PBB SHADOW E&M-EST. PATIENT-LVL III: ICD-10-PCS | Mod: PBBFAC,,, | Performed by: PHYSICIAN ASSISTANT

## 2023-11-15 PROCEDURE — 17000 DESTRUCT PREMALG LESION: CPT | Mod: PBBFAC,PO | Performed by: PHYSICIAN ASSISTANT

## 2023-11-15 PROCEDURE — 99999 PR PBB SHADOW E&M-EST. PATIENT-LVL III: CPT | Mod: PBBFAC,,, | Performed by: PHYSICIAN ASSISTANT

## 2023-11-15 PROCEDURE — 17000 DESTRUCT PREMALG LESION: CPT | Mod: S$PBB,,, | Performed by: PHYSICIAN ASSISTANT

## 2023-11-15 RX ORDER — TRIAMCINOLONE ACETONIDE 0.25 MG/G
CREAM TOPICAL
Qty: 80 G | Refills: 0 | Status: SHIPPED | OUTPATIENT
Start: 2023-11-15 | End: 2024-01-30

## 2023-11-15 NOTE — PROGRESS NOTES
Subjective:       Patient ID:  Fco Millan is a 38 y.o. male who presents for   Chief Complaint   Patient presents with    Mole    Rash     On upper back     Hx of multiple nevi, angiomas, telangiectasia of nose, and SKs, last seen 6/22/22. Here for upper body skin check. 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     C/o new rash or posterior trunk (buttock), duration 2-3 days. +Raised, clustered, localized spot. Some itching.    C/o rough spot of left temple.     Denies any new, changing, or bleeding lesions.    King personal h/o skin CA; +FHX of NMSC (maternal GM)        Review of Systems   Constitutional:  Negative for fever and chills.   Gastrointestinal:  Negative for nausea and vomiting.   Skin:  Positive for activity-related sunscreen use. Negative for itching, rash, dry skin, sun sensitivity, daily sunscreen use, recent sunburn, dry lips and abscesses.   Hematologic/Lymphatic: Does not bruise/bleed easily.        Objective:    Physical Exam   Constitutional: He appears well-developed and well-nourished. No distress.   Neurological: He is alert and oriented to person, place, and time. He is not disoriented.   Psychiatric: He has a normal mood and affect.   Skin:   Areas Examined (abnormalities noted in diagram):   Head / Face Inspection Performed  Neck Inspection Performed  Chest / Axilla Inspection Performed  Abdomen Inspection Performed  Back Inspection Performed  RUE Inspected  LUE Inspection Performed  RLE Inspected  LLE Inspection Performed                       Diagram Legend     Erythematous scaling macule/papule c/w actinic keratosis       Vascular papule c/w angioma      Pigmented verrucoid papule/plaque c/w seborrheic keratosis      Yellow umbilicated papule c/w sebaceous hyperplasia      Irregularly shaped tan macule c/w lentigo     1-2 mm smooth white papules consistent with Milia      Movable subcutaneous cyst with punctum c/w  epidermal inclusion cyst      Subcutaneous movable cyst c/w pilar cyst      Firm pink to brown papule c/w dermatofibroma      Pedunculated fleshy papule(s) c/w skin tag(s)      Evenly pigmented macule c/w junctional nevus     Mildly variegated pigmented, slightly irregular-bordered macule c/w mildly atypical nevus      Flesh colored to evenly pigmented papule c/w intradermal nevus       Pink pearly papule/plaque c/w basal cell carcinoma      Erythematous hyperkeratotic cursted plaque c/w SCC      Surgical scar with no sign of skin cancer recurrence      Open and closed comedones      Inflammatory papules and pustules      Verrucoid papule consistent consistent with wart     Erythematous eczematous patches and plaques     Dystrophic onycholytic nail with subungual debris c/w onychomycosis     Umbilicated papule    Erythematous-base heme-crusted tan verrucoid plaque consistent with inflamed seborrheic keratosis     Erythematous Silvery Scaling Plaque c/w Psoriasis     See annotation      Assessment / Plan:        Multiple nevi  Reviewed abcdef's of moles, encouraged daily spf, regular mole monitoring, and regular skin exams.    Dermatitis  -     triamcinolone acetonide 0.025% (KENALOG) 0.025 % cream; AAA bid prn rash. Mild steroid- may use for up to 2 weeks.  Dispense: 80 g; Refill: 0  Ddx: heat dermatitis vs. Eczematous vs. Hypersensitivity  Trial of above, gentle skin care. Would reconsider biopsy if not improved.     Seborrheic keratosis  Reassurance given.  Lesions are benign.    Hemangioma  Stable, Reassurance. Will continue to monitor. If any changes, would reconsider biopsy.    Actinic Keratosis  Cryosurgery Procedure Note    The patient is informed of the precancerous quality and need for treatment of these lesions. After risks, benefits and alternatives explained, including blistering, pain, hyper- and hypopigmentation, patient verbally consents to cryotherapy to precancerous lesions. Liquid nitrogen  cryosurgery is applied to the 1 actinic keratoses, as detailed in the physical exam, to produce a freeze injury. The patient is aware that blisters may form and is instructed on wound care with gentle cleansing and use of vaseline ointment to keep moist until healed. The patient is supplied a handout on cryosurgery and is instructed to call if lesions do not completely resolve.           Follow up in about 6 months (around 5/15/2024) for actinic keratoses; moles.

## 2024-01-01 NOTE — PROGRESS NOTES
HPI    NP to DKT  Patient here today for yearly eye exam  Vision changes since last eye exam?: Yes at near and when in low light   conditions  Wears SVL reading full-time     Any eye pain today: No    Other ocular symptoms: No    Interested in contact lens fitting today? No              Last edited by Liz Donahue, PCT on 11/17/2022 10:23 AM.            Assessment /Plan     For exam results, see Encounter Report.    Latent hyperopia of both eyes  Eyeglass Final Rx       Eyeglass Final Rx         Sphere Cylinder Axis    Right +1.75 +0.75 180    Left +1.50 +0.25 164      Type: SVL    Expiration Date: 11/17/2023                   Mrx given full time. Discussed presbyopia   RTC 1 yr for dilated eye exam or sooner if any changes to vision.   Discussed above and answered questions.                     Banting/Nurse

## 2024-01-30 ENCOUNTER — OFFICE VISIT (OUTPATIENT)
Dept: OPHTHALMOLOGY | Facility: CLINIC | Age: 39
End: 2024-01-30
Payer: COMMERCIAL

## 2024-01-30 DIAGNOSIS — Z01.00 ENCOUNTER FOR EYE EXAM: ICD-10-CM

## 2024-01-30 DIAGNOSIS — H52.03 LATENT HYPEROPIA OF BOTH EYES: Primary | ICD-10-CM

## 2024-01-30 PROCEDURE — 99999 PR PBB SHADOW E&M-EST. PATIENT-LVL II: CPT | Mod: PBBFAC,,, | Performed by: OPTOMETRIST

## 2024-01-30 PROCEDURE — 92015 DETERMINE REFRACTIVE STATE: CPT | Mod: ,,, | Performed by: OPTOMETRIST

## 2024-01-30 PROCEDURE — 92014 COMPRE OPH EXAM EST PT 1/>: CPT | Mod: S$GLB,,, | Performed by: OPTOMETRIST

## 2024-01-30 NOTE — PROGRESS NOTES
HPI    1. Latent hyperopia OU    Vision changes since last eye exam?: Near vision is getting blurry when   reading books and also experiencing difficulty nighttime driving.     Any eye pain today: No.    Other ocular symptoms: No.    Interested in contact lens fitting today? No.     Last edited by Laurence Forbes on 1/30/2024  8:44 AM.            Assessment /Plan     For exam results, see Encounter Report.    Latent hyperopia of both eyes  Eyeglass Final Rx       Eyeglass Final Rx         Sphere Cylinder Axis    Right +2.50 +0.25 007    Left +2.00 +0.50 161      Type: SVL    Expiration Date: 1/30/2025                    Encounter for eye exam  Normal fundus exam.  Crx given, wear full time.      RTC 1 yr for dilated eye exam with gOCT or sooner if any changes to vision.   Discussed above and answered questions..

## 2024-03-12 ENCOUNTER — PATIENT MESSAGE (OUTPATIENT)
Dept: DERMATOLOGY | Facility: CLINIC | Age: 39
End: 2024-03-12
Payer: OTHER GOVERNMENT

## 2024-03-21 ENCOUNTER — PATIENT MESSAGE (OUTPATIENT)
Dept: INTERNAL MEDICINE | Facility: CLINIC | Age: 39
End: 2024-03-21
Payer: OTHER GOVERNMENT

## 2024-05-28 ENCOUNTER — OFFICE VISIT (OUTPATIENT)
Dept: DERMATOLOGY | Facility: CLINIC | Age: 39
End: 2024-05-28
Payer: OTHER GOVERNMENT

## 2024-05-28 DIAGNOSIS — D18.01 CHERRY ANGIOMA: ICD-10-CM

## 2024-05-28 DIAGNOSIS — L57.0 ACTINIC KERATOSIS: Primary | ICD-10-CM

## 2024-05-28 DIAGNOSIS — D22.9 MULTIPLE NEVI: ICD-10-CM

## 2024-05-28 DIAGNOSIS — L82.1 SEBORRHEIC KERATOSIS: ICD-10-CM

## 2024-05-28 DIAGNOSIS — R22.9 SUBCUTANEOUS NODULE: ICD-10-CM

## 2024-05-28 PROCEDURE — 99213 OFFICE O/P EST LOW 20 MIN: CPT | Mod: PBBFAC | Performed by: DERMATOLOGY

## 2024-05-28 PROCEDURE — 99999 PR PBB SHADOW E&M-EST. PATIENT-LVL III: CPT | Mod: PBBFAC,,, | Performed by: DERMATOLOGY

## 2024-05-28 PROCEDURE — 99213 OFFICE O/P EST LOW 20 MIN: CPT | Mod: S$PBB,,, | Performed by: DERMATOLOGY

## 2024-05-28 NOTE — PROGRESS NOTES
Subjective:      Patient ID:  Fco Millan is a 39 y.o. male who presents for     Last seen 11/15/23 by SEAN Bill for nevi, dermatitis, AK    Patient complains of lesion(s)  Location: L thigh  Duration: a couple years  Symptoms: occasionally tender he thinks from working out; no drainage  Relieving factors/Previous treatments: none      Also requests skin cancer screening  Denies personal h/o skin cancer  Denies fam h/o skin cancer in 1st degree relative        Review of Systems   Skin:  Positive for activity-related sunscreen use. Negative for daily sunscreen use.       Objective:   Physical Exam   Constitutional: He appears well-developed and well-nourished. No distress.   Neurological: He is alert and oriented to person, place, and time. He is not disoriented.   Psychiatric: He has a normal mood and affect.   Skin:   Areas Examined (abnormalities noted in diagram):   Scalp / Hair Palpated and Inspected  Head / Face Inspection Performed  Neck Inspection Performed  Chest / Axilla Inspection Performed  Abdomen Inspection Performed  Genitals / Buttocks / Groin Inspection Performed  Back Inspection Performed  RUE Inspected  LUE Inspection Performed  RLE Inspected  LLE Inspection Performed  Nails and Digits Inspection Performed                         Diagram Legend     Erythematous scaling macule/papule c/w actinic keratosis       Vascular papule c/w angioma      Pigmented verrucoid papule/plaque c/w seborrheic keratosis      Yellow umbilicated papule c/w sebaceous hyperplasia      Irregularly shaped tan macule c/w lentigo     1-2 mm smooth white papules consistent with Milia      Movable subcutaneous cyst with punctum c/w epidermal inclusion cyst      Subcutaneous movable cyst c/w pilar cyst      Firm pink to brown papule c/w dermatofibroma      Pedunculated fleshy papule(s) c/w skin tag(s)      Evenly pigmented macule c/w junctional nevus     Mildly variegated pigmented, slightly irregular-bordered macule  c/w mildly atypical nevus      Flesh colored to evenly pigmented papule c/w intradermal nevus       Pink pearly papule/plaque c/w basal cell carcinoma      Erythematous hyperkeratotic cursted plaque c/w SCC      Surgical scar with no sign of skin cancer recurrence      Open and closed comedones      Inflammatory papules and pustules      Verrucoid papule consistent consistent with wart     Erythematous eczematous patches and plaques     Dystrophic onycholytic nail with subungual debris c/w onychomycosis     Umbilicated papule    Erythematous-base heme-crusted tan verrucoid plaque consistent with inflamed seborrheic keratosis     Erythematous Silvery Scaling Plaque c/w Psoriasis     See annotation      Assessment / Plan:        Actinic keratosis  Cryosurgery Procedure Note    Verbal consent from the patient is obtained and the patient is aware of the precancerous quality and need for treatment of these lesions. Liquid nitrogen cryosurgery is applied to the 1 actinic keratoses, as detailed in the physical exam, to produce a freeze injury. The patient is aware that blisters may form and is instructed on wound care with gentle cleansing and use of vaseline ointment to keep moist until healed. The patient is supplied a handout on cryosurgery and is instructed to call if lesions do not completely resolve.    Subcutaneous nodule  - EIC vs lipoma. Present, stable, and asymptomatic for many years per pt. Discussed suspected etiologies and likely benign nature of lesion, but that I cannot exclude malignancy with 100% certainty unless it is excised and sent for pathologic exam. Offered referral to gen surg for excision, patient declines at this time. Continue to monitor.    Multiple nevi  Total body skin examination performed today including at least 12 points as noted in physical examination. No lesions suspicious for malignancy noted.  Reassurance provided.  Instructed patient to observe lesion(s) for changes and follow up  in clinic if changes are noted. Discussed ABCDE's of moles     Patient instructed in importance in daily sun protection of at least spf 30. Mineral sunscreen ingredients preferred (Zinc +/- Titanium).   Patient encouraged to wear hat for all outdoor exposure.   Also discussed sun avoidance and use of protective clothing.    Gilbert angioma  This is a benign vascular lesion. Reassurance given. No treatment required.     Seborrheic keratosis  These are benign inherited growths without a malignant potential. Reassurance given to patient. No treatment is necessary.              Follow up in about 1 year (around 5/28/2025).        LOS NUMBER AND COMPLEXITY OF PROBLEMS    COMPLEXITY OF DATA RISK TOTAL TIME (m)   51709  88203 [] 1 self-limited or minor problem [x] Minimal to none [] No treatment recommended or patient to monitor. Reassurance.  15-29  10-19   93153  19624 Low  [x] 2 or more self limited or minor problems  [] 1 stable chronic illness  [] 1 acute, uncomplicated illness or injury Limited (2)  [] Prior external notes from each unique source  [] Review result of each unique test  [] Order each unique test  OR [] Independent historian Low  [x]  OTC medications   []  Discussed/Decision for minor skin surgery (no risk factors) 30-44  20-29   94984  04148 Moderate  []  1 or more chronic unstable illness (not at goal or progression or exacerbation) or SE of treatment  []  2 or more stable chronic illnesses  []  1 acute illness with systemic symptoms  []  1 acute complicated injury  []  1 undiagnosed new problem with uncertain prognosis Moderate (1/3 below)  []  3 or more data items        *Now includes independent historian  []  Independent interpretation of a test  []  Discuss management/test with another provider Moderate  []  Prescription drug mgmt  []  Discussed/Decision for Minor surgery with risk factors  []  Mgmt limited by social determinates 45-59  30-39   85678  70788 High  []  1 or more chronic illness with  severe exacerbation, progression or SE of treatment  []  1 acute or chronic illness/injury that poses a threat to life or bodily function Extensive (2/3 below)  []  3 or more data items        *Now includes independent historian.  []  Independent interpretation of a test  []  Discuss management/test with another provider High  []  Major surgery with risk discussed  []  Drug therapy requiring intensive monitoring for toxicity  []  Hospitalization  []  Decision for DNR 60-74  40-54

## 2024-05-28 NOTE — PATIENT INSTRUCTIONS
CRYOSURGERY      Your doctor has used a method called cryosurgery to treat your skin condition. Cryosurgery refers to the use of very cold substances to treat a variety of skin conditions such as warts, pre-skin cancers, molluscum contagiosum, sun spots, and several benign growths. The substance we use in cryosurgery is liquid nitrogen and is so cold (-195 degrees Celsius) that it burns when administered.     Following treatment in the office, the skin may immediately burn and become red. You may find the area around the lesion is affected as well. It is sometimes necessary to treat not only the lesion, but a small area of the surrounding normal skin to achieve a good response.     A blister, and even a blood filled blister, may form after treatment.   This is a normal response. If the blister is painful, it is acceptable to sterilize a needle with rubbing alcohol and gently pop the blister. It is important that you gently wash the area with soap and warm water as the blister fluid may contain wart virus if a wart was treated. Do not remove the roof of the blister.     The area treated can take anywhere from 1-3 weeks to heal. Healing time depends on the kind of skin lesion treated, the location, and how aggressively the lesion was treated. It is recommended that the areas treated are covered with Vaseline or bacitracin ointment and a band-aid. If a band-aid is not practical, just ointment applied several times per day will do. Keeping these areas moist will speed the healing time.    Treatment with liquid nitrogen can leave a scar. In dark skin, it may be a light or dark scar, in light skin it may be a white or pink scar. These will generally fade with time, but may never go away completely.     If you have any concerns after your treatment, please feel free to call the office.       [address] [place] [address]/ [phone] FAX/ [email or web address]

## 2024-09-04 ENCOUNTER — TELEPHONE (OUTPATIENT)
Dept: INTERNAL MEDICINE | Facility: CLINIC | Age: 39
End: 2024-09-04
Payer: OTHER GOVERNMENT

## 2024-09-04 ENCOUNTER — OFFICE VISIT (OUTPATIENT)
Dept: INTERNAL MEDICINE | Facility: CLINIC | Age: 39
End: 2024-09-04
Payer: OTHER GOVERNMENT

## 2024-09-04 DIAGNOSIS — F41.9 ANXIETY: Primary | Chronic | ICD-10-CM

## 2024-09-04 DIAGNOSIS — K21.9 GASTROESOPHAGEAL REFLUX DISEASE WITHOUT ESOPHAGITIS: Chronic | ICD-10-CM

## 2024-09-04 PROCEDURE — 99214 OFFICE O/P EST MOD 30 MIN: CPT | Mod: 95,,, | Performed by: FAMILY MEDICINE

## 2024-09-04 PROCEDURE — G2211 COMPLEX E/M VISIT ADD ON: HCPCS | Mod: 95,,, | Performed by: FAMILY MEDICINE

## 2024-09-04 RX ORDER — SERTRALINE HYDROCHLORIDE 50 MG/1
50 TABLET, FILM COATED ORAL DAILY
Qty: 30 TABLET | Refills: 1 | Status: SHIPPED | OUTPATIENT
Start: 2024-09-04

## 2024-09-04 NOTE — PROGRESS NOTES
"TELEMEDICINE VIRTUAL VIDEO VISIT  9/4/24  2:20 PM CDT    Visit Type: Audiovisual    Patient's Location: Fco represents that they are located within the [facility] [place].    History of Present Illness    Fco presents today for anxiety management.    He reports chronic, smoldering anxiety affecting his quality of life, dating back to his [employer] experience. Symptoms include difficulty relaxing and enjoying moments, increased irritability, and worsening in group settings. He has trouble settling his mind despite regular exercise. He desires to improve his ability to enjoy activities with his wife and travel without feeling on edge. He denies sleep disturbances. He has an identical twin brother with similar history who uses Ritalin and Xanax for symptom management. He denies depression and emphasizes the importance of not documenting depression due to his need for top-secret clearance. He occasionally uses nicotine gum (quarter piece) to manage anxiety symptoms, reporting it helps level out his mind during anxious episodes.    He reports a tender, non-hard abdominal lump present for at least a year, located 3-4 inches left of his navel. The lump is fixed in position with no significant growth or changes over time. He denies protrusion when straining or during physical activities.    He has a history of acid reflux, improved with dietary changes including avoiding milk products and fried foods. He now only requires occasional medication for symptom management.    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 He consumes coffee in the first half of the day, reporting it helps him feel "very level." After the effects wear off, he experiences difficulty relaxing, sitting still, and enjoying the moment.    His wife takes Celexa for mood management, which has significantly improved her tolerance and overall well-being.    He recently " underwent a cardiac checkup including an ultrasound and calcium CT, which he undergoes every 3-5 years. Having just turned 39, he is due for an executive health exam and expresses interest in additional tests such as testosterone levels and cancer screening (possibly [place] genetic screening) offered by his employer for individuals over 40.       Review of Systems   Constitutional:  Negative for activity change and unexpected weight change.   HENT:  Negative for hearing loss, rhinorrhea and trouble swallowing.    Eyes:  Negative for discharge and visual disturbance.   Respiratory:  Negative for chest tightness and wheezing.    Cardiovascular:  Negative for chest pain and palpitations.   Gastrointestinal:  Negative for blood in stool, constipation, diarrhea and vomiting.   Endocrine: Negative for polydipsia and polyuria.   Genitourinary:  Negative for difficulty urinating, hematuria and urgency.   Musculoskeletal:  Negative for joint swelling and neck pain.   Neurological:  Negative for weakness and headaches.   Psychiatric/Behavioral:  Negative for confusion and dysphoric mood.        Assessment & Plan     Assessed chronic anxiety symptoms affecting quality of life, warranting pharmacological intervention   Recommend starting sertraline (SSRI) for chronic anxiety management, noting non-habit forming nature and good safety profile   Considered patient's concerns about medication effects on security clearance, emphasizing diagnosis as simple anxiety   Acknowledged patient's use of nicotine gum for symptom management, deeming it relatively safe  F41.1 GENERALIZED ANXIETY DISORDER:   Assessed the patient's chronic anxiety affecting quality of life, difficulty relaxing, and irritability, with symptoms dating back to  service.   Noted patient's use of nicotine gum to manage anxiety symptoms.   Evaluated anxiety as chronic and smoldering, affecting quality of life and interpersonal relationships.   Discussed  treatment options, recommending an SSRI (selective serotonin reuptake inhibitor) for chronic anxiety management.   Prescribed sertraline 50 mg daily for anxiety, with potential increase to 100 mg.   Provided 30-day supply with 1 refill.   Instructed to take medication at the same time each day, either morning or evening based on patient preference.   Explained SSRI mechanism of action and expected timeline for therapeutic effect (minimum 1 month).   Discussed potential side effects of sertraline, particularly sexual side effects (e.g., delayed orgasm).   Informed patient about rare possibility of excessive anxiety reduction leading to apathy.   Clarified that SSRIs are not addictive or habit-forming.   Addressed patient's concerns about long-term neurological effects, stating no evidence of decreased longevity with SSRI use.   Advised to continue medication for at least 1 month to evaluate efficacy, even if initially seems ineffective.   Instructed to discontinue medication and contact office if experiencing concerning side effects.   Scheduled follow up in approximately 1 month, either as a video visit or during executive health exam.  Z00.00 ENCOUNTER FOR GENERAL ADULT MEDICAL EXAM WITHOUT ABNORMAL FINDINGS:   Discussed scheduling an executive health exam for the patient.   Planned to conduct a comprehensive exam during the upcoming executive health visit.   Instructed patient to contact office to schedule executive health exam.  K21.9 GASTRO-ESOPHAGEAL REFLUX DISEASE WITHOUT ESOPHAGITIS:   Noted improvement in acid reflux symptoms with dietary changes and as-needed medication.   Advised to continue using as-needed medication for acid reflux symptoms.  L72.9 FOLLICULAR CYST OF THE SKIN AND SUBCUTANEOUS TISSUE, UNSPECIFIED:   Noted patient's report of a small bump on the lower left quadrant of the abdomen, present for at least a year, tender when pressed.   Assessed the bump as likely a subcutaneous cyst, benign in  nature.   Planned to examine the bump during the upcoming executive health exam.  Z79.899 OTHER LONG TERM (CURRENT) DRUG THERAPY:   Prescribed long-term sertraline (Zoloft) therapy for anxiety management.  F17.200 NICOTINE DEPENDENCE, UNSPECIFIED, UNCOMPLICATED:   Noted patient's use of nicotine gum to manage anxiety symptoms.   Acknowledged patient's use of nicotine gum as a relatively safe method for managing anxiety symptoms.       1. Anxiety  -     sertraline (ZOLOFT) 50 MG tablet; Take 1 tablet (50 mg total) by mouth once daily.  Dispense: 30 tablet; Refill: 1    2. Gastroesophageal reflux disease without esophagitis  Overview:  EGD WNL 3/9/2022      No other significant complaints or concerns were reported.  Today's visit involved the intricate management of episodic problem(s) and the ongoing care for the patient's serious or complex condition(s) listed above, reflecting the inherent complexity of providing longitudinal, comprehensive evaluation and management as the central hub for the patient's primary care services.  There were no vitals filed for this visit.  PHYSICAL EXAM:  GENERAL APPEARANCE:  - Alert and grossly oriented.  - No apparent distress, breathing comfortably.     EYES:  - Sclera without icterus.     EARS, NOSE, AND THROAT:  - No visible abnormalities.     RESPIRATORY:  - No respiratory distress.  - No audible wheezing or cough.     PSYCHIATRIC:  - Mood and affect appropriate; behavior cooperative.    This note was generated with the assistance of ambient listening technology. Verbal consent was obtained by the patient and accompanying visitor(s) for the recording of patient appointment to facilitate this note. I attest to having reviewed and edited the generated note for accuracy, though some syntax or spelling errors may persist. Please contact the author of this note for any clarification.      I spent a total of 28 minutes today evaluating and managing this patient for this encounter.   "This includes face to face time and non-face to face time preparing to see the patient (eg, review of tests), obtaining and/or reviewing separately obtained history, documenting clinical information in the electronic or other health record, independently interpreting results and communicating results to the patient/family/caregiver, or care coordinator. This time was exclusive of any separately billable procedures for this patient and exclusive of time spent treating any other patient.    Documentation entered by me for this encounter may have been done in part using speech-recognition technology. Although I have made an effort to ensure accuracy, "sound like" errors may exist and should be interpreted in context.    Each patient to whom medical services are provided by telemedicine is: (1) informed of the relationship between the physician and patient and the respective role of any other health care provider with respect to management of the patient; and (2) notified that he or she may decline to receive medical services by telemedicine and may withdraw from such care at any time.  "

## 2024-10-08 DIAGNOSIS — Z00.00 ROUTINE GENERAL MEDICAL EXAMINATION AT A HEALTH CARE FACILITY: Primary | ICD-10-CM

## 2024-10-16 ENCOUNTER — TELEPHONE (OUTPATIENT)
Dept: SPORTS MEDICINE | Facility: CLINIC | Age: 39
End: 2024-10-16
Payer: OTHER GOVERNMENT

## 2024-10-16 DIAGNOSIS — M25.511 RIGHT SHOULDER PAIN, UNSPECIFIED CHRONICITY: Primary | ICD-10-CM

## 2024-10-16 NOTE — TELEPHONE ENCOUNTER
Called pt regarding xr arrival time for appointment. 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 Assisted with r/s to later appointment for pt to be able to arrive early for xrays

## 2024-10-17 ENCOUNTER — OFFICE VISIT (OUTPATIENT)
Dept: SPORTS MEDICINE | Facility: CLINIC | Age: 39
End: 2024-10-17
Payer: OTHER GOVERNMENT

## 2024-10-17 ENCOUNTER — HOSPITAL ENCOUNTER (OUTPATIENT)
Dept: RADIOLOGY | Facility: HOSPITAL | Age: 39
Discharge: HOME | End: 2024-10-17
Attending: STUDENT IN AN ORGANIZED HEALTH CARE EDUCATION/TRAINING PROGRAM
Payer: OTHER GOVERNMENT

## 2024-10-17 VITALS — RESPIRATION RATE: 17 BRPM | BODY MASS INDEX: 22.41 KG/M2 | WEIGHT: 156.5 LBS | HEIGHT: 70 IN

## 2024-10-17 DIAGNOSIS — M25.511 RIGHT SHOULDER PAIN, UNSPECIFIED CHRONICITY: ICD-10-CM

## 2024-10-17 DIAGNOSIS — M75.21 BICEPS TENDONITIS ON RIGHT: Primary | ICD-10-CM

## 2024-10-17 PROCEDURE — 97110 THERAPEUTIC EXERCISES: CPT | Mod: PBBFAC | Performed by: STUDENT IN AN ORGANIZED HEALTH CARE EDUCATION/TRAINING PROGRAM

## 2024-10-17 PROCEDURE — 99204 OFFICE O/P NEW MOD 45 MIN: CPT | Mod: S$PBB,,, | Performed by: STUDENT IN AN ORGANIZED HEALTH CARE EDUCATION/TRAINING PROGRAM

## 2024-10-17 PROCEDURE — 73030 X-RAY EXAM OF SHOULDER: CPT | Mod: TC,RT

## 2024-10-17 PROCEDURE — 99999 PR PBB SHADOW E&M-EST. PATIENT-LVL III: CPT | Mod: PBBFAC,,, | Performed by: STUDENT IN AN ORGANIZED HEALTH CARE EDUCATION/TRAINING PROGRAM

## 2024-10-17 PROCEDURE — 73030 X-RAY EXAM OF SHOULDER: CPT | Mod: 26,RT,, | Performed by: RADIOLOGY

## 2024-10-17 PROCEDURE — 99213 OFFICE O/P EST LOW 20 MIN: CPT | Mod: PBBFAC,25 | Performed by: STUDENT IN AN ORGANIZED HEALTH CARE EDUCATION/TRAINING PROGRAM

## 2024-10-17 NOTE — PROGRESS NOTES
Orthopaedics Sports Medicine     Shoulder Initial Visit         10/17/2024    Referring MD: No ref. provider found    Chief Complaint   Patient presents with    Right Shoulder - Pain         History of Present Illness:   Fco Millan is a 39 y.o. right-hand dominant male who presents with right shoulder pain and dysfunction.    Onset of the symptoms was approximately 2 months ago.      Inciting event: No specific injury or trauma.     Current symptoms include right shoulder pain mostly localized to the anterior shoulder. He also reports some posterior pain with certain movements. He describes the pain as intermittent aching, throbbing, sharp, and shooting pain. He rates his pain a 3/10 today.  Patient reports he has been treated for left shoulder labral tear in the past and symptoms feel similar.  He denies any shoulder instability, mechanical symptoms, or neck pain/radicular symptoms.     Pain is aggravated by certain movements.       Evaluation to date: X-Ray,      Treatment to date: Rest, activity modification, Aleve      Past Medical History:   Past Medical History:   Diagnosis Date    Chronic neck pain 4/24/2023    Gastroesophageal reflux disease without esophagitis 3/11/2021    GERD (gastroesophageal reflux disease)        Past Surgical History:   Past Surgical History:   Procedure Laterality Date    APPENDECTOMY      ESOPHAGOGASTRODUODENOSCOPY N/A 3/9/2022    Procedure: ESOPHAGOGASTRODUODENOSCOPY (EGD);  Surgeon: Sarahi West MD;  Location: [facility];  Service: Endoscopy;  Laterality: N/A;    TONSILLECTOMY         Medications:  Patient's Medications   New Prescriptions    No medications on file   Previous Medications    PANTOPRAZOLE (PROTONIX) 20 MG TABLET    Take 1 tablet (20 mg total) by mouth every morning.    SERTRALINE (ZOLOFT) 50 MG TABLET    Take 1 tablet (50 mg total) by mouth once daily.   Modified Medications    No medications on file   Discontinued Medications    No medications on file  "      Allergies: Review of patient's allergies indicates:  No Known Allergies    Social History:   Home town: [place], [place]  Occupation:   Alcohol use: He reports no history of alcohol use.  Tobacco use: He reports that he has never smoked. He has never used smokeless tobacco.    Review of systems:  History of recent illness, fevers, shakes, or chills: no  History of cardiac problems or chest pain: no  History of pulmonary problems or asthma: no  History of diabetes: no  History of prior dvt or clotting problems: no  History of sleep apnea: no      Physical Examination:  Estimated body mass index is 22.46 kg/m² as calculated from the following:    Height as of this encounter: 5' 10" (1.778 m).    Weight as of this encounter: 71 kg (156 lb 8.4 oz).    General  Healthy appearing male in no acute distress  Alert and oriented, normal mood, appropriate affect    Shoulder Examination:  Patient is alert and oriented, no distress. Skin is intact. Neuro is normal with no focal motor or sensory findings.    Cervical exam is unremarkable. Intact cervical ROM. Negative Spurling's test    Physical Exam:  RIGHT    LEFT    Scap Dyskinesis/Winging (-)    (-)    Tenderness:          Greater Tuberosity             (-)    (-)  Bicipital Groove  +    (-)  AC joint   (-)    (-)  Other:     ROM:  Forward Elevation 180    180  Abduction  120    120  ER (at side)  80    80  IR   T8    T8    Strength:   Supraspinatus  5/5    5/5  Infraspinatus  5/5    5/5  Subscap / IR  5/5    5/5     Special Tests:   Apprehension:   (-)    (-)   Brisa Relocation:  (-)    (-)   Jerk / Posterior Load:  (-)    (-)   Neer:    (-)    (-)   Sands:   (-)    (-)   SS Stress:   (-)    (-)   Bear Hug:   (-)    (-)   [place]'s:   +    (-)   Resisted Thrower's:   +    (-)   Speed's   +    (-)   Cross Arm Abduction:  (-)    (-)    Neurovascular examination  - Motor grossly intact bilaterally to shoulder abduction, elbow flexion and extension, wrist flexion " and extension, and intrinsic hand musculature  - Sensation intact to light touch bilaterally in axillary, median, radial, and ulnar distributions  - Symmetrical radial pulses      Imaging:  XR Results:  Results for orders placed during the hospital encounter of 10/17/24    X-ray Shoulder 2 or More Views Right    Narrative  EXAMINATION:  XR SHOULDER COMPLETE 2 OR MORE VIEWS RIGHT    CLINICAL HISTORY:  Pain in right shoulder    TECHNIQUE:  Two or three views of the right shoulder were preformed.    COMPARISON:  04/16/2021    FINDINGS:  No acute fracture or dislocation.  AC joint and glenohumeral joint spaces are well maintained.  Visualized lung parenchyma is clear.    Impression  1.  As above      Electronically signed by: Luis M Lewis DO  Date:    10/17/2024  Time:    13:34        CT Results:  No results found for this or any previous visit.      Physician Read: I agree with the above impression.      Impression:  39 y.o. male with right shoulder biceps tendonitis       Plan:  Discussed diagnosis and treatment options with patient today.  His history, physical exam, imaging findings are most consistent with long head biceps tendinitis.  Discussed non-operative treatment options in the form of rest, activity modifications, oral anti-inflammatories, corticosteroid injections, and physical therapy/physician directed home exercise program. He reports good relief with CSI to the left shoulder in the past for somewhat similar issue.   I recommend proceeding with US guided corticosteroid injection into the right shoulder biceps tendon sheath as well as home exercise program. The patient is in agreement with this plan.   Procedure performed today and patient tolerated the procedure well with no immediate complications.   At least 15 minutes were spent developing, teaching, and performing a home exercise program.  A written summary was provided and all questions were answered.  This service was performed under the  direction of Luciano Robledo MD.  CPT 30846-[id number].  Follow up with me as needed.            Luciano Robledo MD    I, Elieser Gu, acted as a scribe for Luciano Robledo MD for the duration of this office visit.

## 2024-10-18 ENCOUNTER — TELEPHONE (OUTPATIENT)
Dept: SPORTS MEDICINE | Facility: CLINIC | Age: 39
End: 2024-10-18
Payer: OTHER GOVERNMENT

## 2024-10-18 NOTE — TELEPHONE ENCOUNTER
----- Message from Briana sent at 10/18/2024  8:56 AM CDT -----  Contact: patient  Type:   Apoointment Request    Name of Caller:Fco Millan   Would the patient rather a call back or a response via [facility]? Call   Best Call Back Number: [phone]  Additional Information: pt would like a call back in regards to rescheduling his procedure.

## 2024-10-18 NOTE — TELEPHONE ENCOUNTER
Returned pt call. Assisted with r/s procedure to next week. Pt verbalized understanding of appointment

## 2024-10-22 ENCOUNTER — PROCEDURE VISIT (OUTPATIENT)
Dept: SPORTS MEDICINE | Facility: CLINIC | Age: 39
End: 2024-10-22
Payer: OTHER GOVERNMENT

## 2024-10-22 DIAGNOSIS — M25.511 CHRONIC RIGHT SHOULDER PAIN: ICD-10-CM

## 2024-10-22 DIAGNOSIS — M75.41 IMPINGEMENT SYNDROME OF RIGHT SHOULDER: ICD-10-CM

## 2024-10-22 DIAGNOSIS — M75.21 BICEPS TENDONITIS ON RIGHT: Primary | ICD-10-CM

## 2024-10-22 DIAGNOSIS — G89.29 CHRONIC RIGHT SHOULDER PAIN: ICD-10-CM

## 2024-10-22 PROCEDURE — 99999PBSHW PR PBB SHADOW TECHNICAL ONLY FILED TO HB: Mod: PBBFAC,,,

## 2024-10-22 PROCEDURE — 20550 NJX 1 TENDON SHEATH/LIGAMENT: CPT | Mod: PBBFAC,RT | Performed by: STUDENT IN AN ORGANIZED HEALTH CARE EDUCATION/TRAINING PROGRAM

## 2024-10-22 PROCEDURE — 20611 DRAIN/INJ JOINT/BURSA W/US: CPT | Mod: PBBFAC | Performed by: STUDENT IN AN ORGANIZED HEALTH CARE EDUCATION/TRAINING PROGRAM

## 2024-10-22 PROCEDURE — 76942 ECHO GUIDE FOR BIOPSY: CPT | Mod: PBBFAC | Performed by: STUDENT IN AN ORGANIZED HEALTH CARE EDUCATION/TRAINING PROGRAM

## 2024-10-22 RX ORDER — BETAMETHASONE SODIUM PHOSPHATE AND BETAMETHASONE ACETATE 3; 3 MG/ML; MG/ML
6 INJECTION, SUSPENSION INTRA-ARTICULAR; INTRALESIONAL; INTRAMUSCULAR; SOFT TISSUE
Status: DISCONTINUED | OUTPATIENT
Start: 2024-10-22 | End: 2024-10-22 | Stop reason: HOSPADM

## 2024-10-22 RX ADMIN — BETAMETHASONE ACETATE AND BETAMETHASONE SODIUM PHOSPHATE 6 MG: 3; 3 INJECTION, SUSPENSION INTRA-ARTICULAR; INTRALESIONAL; INTRAMUSCULAR; SOFT TISSUE at 09:10

## 2024-10-22 NOTE — PROCEDURES
Large Joint Aspiration/Injection: R subacromial bursa    Date/Time: 10/22/2024 9:00 AM    Performed by: Alec Gibbons MD  Authorized by: Alec Gibbons MD    Consent Done?:  Yes (Verbal)  Indications:  Pain and diagnostic evaluation  Site marked: the procedure site was marked    Timeout: prior to procedure the correct patient, procedure, and site was verified      Local anesthesia used?: Yes    Anesthesia:  Local infiltration  Local anesthetic:  Topical anesthetic, bupivacaine 0.5% without epinephrine and lidocaine 1% without epinephrine  Anesthetic total (ml):  4      Details:  Needle Size:  21 G  Ultrasonic Guidance for needle placement?: Yes    Images are saved and documented.  Approach:  Lateral  Location:  Shoulder  Site:  R subacromial bursa  Medications:  6 mg betamethasone acetate-betamethasone sodium phosphate 6 mg/mL  Patient tolerance:  Patient tolerated the procedure well with no immediate complications     Ultrasound guidance was used for needle localization. Images were saved and stored for documentation. The appropriate structures were visualized. Dynamic visualization of the needle was continuous throughout the procedures and maintained good position.     We discussed the proper protocols after the injection such as no submerging pools, baths tubs, or hot tubs for 24 hr.  Showering is okay today.  We also discussed that blood sugars can be elevated after an injection and asked patient to properly check their sugars over the next few days and contact their PCP if there are any concerns.  We discussed red flags such as fevers, chills, red, warm, tender joint at the area of injection to please seek medical care immediately.      Tendon Sheath    Date/Time: 10/22/2024 9:00 AM    Performed by: Alec Gibbons MD  Authorized by: Alec Gibbons MD    Consent Done?:  Yes (Verbal)  Indications:  Pain and diagnostic evaluation  Site marked: the procedure site was marked    Timeout: prior to  procedure the correct patient, procedure, and site was verified    Local anesthesia used?: Yes    Anesthesia:  Local infiltration  Local anesthetic:  Bupivacaine 0.5% without epinephrine, lidocaine 1% without epinephrine and topical anesthetic  Anesthetic total (ml):  2    Location:  Shoulder  Site:  R bicep tendon  Ultrasonic guidance for needle placement?: Yes    Needle size:  22 G  Approach:  Volar (out of plane)  Medications:  6 mg betamethasone acetate-betamethasone sodium phosphate 6 mg/mL  Patient tolerance:  Patient tolerated the procedure well with no immediate complications    Additional Comments: Ultrasound guidance was used for needle localization. Images were saved and stored for documentation. The appropriate structures were visualized. Dynamic visualization of the needle was continuous throughout the procedures and maintained good position.     We discussed the proper protocols after the injection such as no submerging pools, baths tubs, or hot tubs for 24 hr.  Showering is okay today.  We also discussed that blood sugars can be elevated after an injection and asked patient to properly check their sugars over the next few days and contact their PCP if there are any concerns.  We discussed red flags such as fevers, chills, red, warm, tender joint at the area of injection to please seek medical care immediately.

## 2024-10-22 NOTE — PATIENT INSTRUCTIONS
Assessment:  Fco Millan is a 39 y.o. male with a chief complaint of Pain of the Right Shoulder    Encounter Diagnoses   Name Primary?    Biceps tendonitis on right Yes    Impingement syndrome of right shoulder     Chronic right shoulder pain       Plan:  R shoulder injections today  Proximal biceps tendon sheath injection.    Subacromial bursa, aimed a bit more posteriorly posteriorly  Proper protocols after the injection included: no submerging pools, baths tubs, or hot tubs for 24 hr.  Showering is okay today.  Side effects of the corticosteroid injection can include elevated blood glucose levels and blood pressures, so if you are taking medications for these, please monitor closely, and contact your PCP if any issues.  Red flag symptoms include fever, chills, nausea, vomiting, red, warm, tender joint at the area of injection.  If you are noticing these symptoms, they may be indicative of an infection, and please seek medical care immediately, either by calling our clinic or going to the emergency room.  Patient was started home exercise program.  Recommend to contact if not feeling significant improvement after 2-3 weeks, for further evaluation, consideration for MRI vs glenohumeral joint corticosteroid injection    Follow-up:  As needed or sooner if there are any problems between now and then.    Thank you for choosing [facility] and Dr. Alec Gibbons for your orthopedic & sports medicine care. It is our goal to provide you with exceptional care that will help keep you healthy, active, and get you back in the game.    Please do not hesitate to reach out to us via email, phone, or MyChart with any questions, concerns, or feedback.    If you are experiencing pain/discomfort ,or have questions after 5pm and would like to be connected to the [facility] on-call team, please call this number and specify which Sports Medicine provider is treating you:  [phone]

## 2024-10-31 ENCOUNTER — CLINICAL SUPPORT (OUTPATIENT)
Dept: INTERNAL MEDICINE | Facility: CLINIC | Age: 39
End: 2024-10-31

## 2024-10-31 ENCOUNTER — CLINICAL SUPPORT (OUTPATIENT)
Dept: PULMONOLOGY | Facility: CLINIC | Age: 39
End: 2024-10-31

## 2024-10-31 ENCOUNTER — OFFICE VISIT (OUTPATIENT)
Dept: INTERNAL MEDICINE | Facility: CLINIC | Age: 39
End: 2024-10-31

## 2024-10-31 ENCOUNTER — CLINICAL SUPPORT (OUTPATIENT)
Dept: INTERNAL MEDICINE | Facility: CLINIC | Age: 39
End: 2024-10-31
Payer: OTHER GOVERNMENT

## 2024-10-31 DIAGNOSIS — Z00.00 ROUTINE GENERAL MEDICAL EXAMINATION AT A HEALTH CARE FACILITY: Primary | ICD-10-CM

## 2024-10-31 DIAGNOSIS — Z00.00 ROUTINE GENERAL MEDICAL EXAMINATION AT A HEALTH CARE FACILITY: ICD-10-CM

## 2024-10-31 DIAGNOSIS — Z12.9 CANCER SCREENING: Primary | ICD-10-CM

## 2024-10-31 DIAGNOSIS — R68.82 LOW LIBIDO: Chronic | ICD-10-CM

## 2024-10-31 DIAGNOSIS — Z83.79 FAMILY HISTORY OF BARRETT'S ESOPHAGUS: Chronic | ICD-10-CM

## 2024-10-31 DIAGNOSIS — K21.9 GASTROESOPHAGEAL REFLUX DISEASE WITHOUT ESOPHAGITIS: Chronic | ICD-10-CM

## 2024-10-31 DIAGNOSIS — Z00.00 PREVENTATIVE HEALTH CARE: Primary | ICD-10-CM

## 2024-10-31 LAB
ALBUMIN SERPL BCP-MCNC: 4.6 G/DL (ref 3.5–5.2)
ALP SERPL-CCNC: 68 U/L (ref 40–150)
ALT SERPL W/O P-5'-P-CCNC: 14 U/L (ref 10–44)
ANION GAP SERPL CALC-SCNC: 9 MMOL/L (ref 8–16)
AST SERPL-CCNC: 16 U/L (ref 10–40)
BILIRUB SERPL-MCNC: 1 MG/DL (ref 0.1–1)
BILIRUB UR QL STRIP: NEGATIVE
BRPFT: NORMAL
BUN SERPL-MCNC: 20 MG/DL (ref 6–20)
CALCIUM SERPL-MCNC: 10 MG/DL (ref 8.7–10.5)
CHLORIDE SERPL-SCNC: 105 MMOL/L (ref 95–110)
CHOLEST SERPL-MCNC: 216 MG/DL (ref 120–199)
CHOLEST/HDLC SERPL: 3.9 {RATIO} (ref 2–5)
CLARITY UR: CLEAR
CO2 SERPL-SCNC: 27 MMOL/L (ref 23–29)
COLOR UR: YELLOW
CREAT SERPL-MCNC: 1.2 MG/DL (ref 0.5–1.4)
ERYTHROCYTE [DISTWIDTH] IN BLOOD BY AUTOMATED COUNT: 11.3 % (ref 11.5–14.5)
EST. GFR  (NO RACE VARIABLE): >60 ML/MIN/1.73 M^2
ESTIMATED AVG GLUCOSE: 94 MG/DL (ref 68–131)
FEF 25 75 LLN: 2.72
FEF 25 75 PRE REF: 78.3 %
FEF 25 75 REF: 4.43
FEV1 FVC LLN: 70
FEV1 FVC PRE REF: 98.4 %
FEV1 FVC REF: 81
FEV1 LLN: 3.34
FEV1 PRE REF: 92.6 %
FEV1 REF: 4.21
FVC LLN: 4.17
FVC PRE REF: 93.7 %
FVC REF: 5.24
GLUCOSE SERPL-MCNC: 92 MG/DL (ref 70–110)
GLUCOSE UR QL STRIP: NEGATIVE
HBA1C MFR BLD: 4.9 % (ref 4–5.6)
HCT VFR BLD AUTO: 44 % (ref 40–54)
HDLC SERPL-MCNC: 55 MG/DL (ref 40–75)
HDLC SERPL: 25.5 % (ref 20–50)
HGB BLD-MCNC: 15.2 G/DL (ref 14–18)
HGB UR QL STRIP: NEGATIVE
KETONES UR QL STRIP: NEGATIVE
LDLC SERPL CALC-MCNC: 141.6 MG/DL (ref 63–159)
LEUKOCYTE ESTERASE UR QL STRIP: NEGATIVE
MCH RBC QN AUTO: 31.5 PG (ref 27–31)
MCHC RBC AUTO-ENTMCNC: 34.5 G/DL (ref 32–36)
MCV RBC AUTO: 91 FL (ref 82–98)
NITRITE UR QL STRIP: NEGATIVE
NONHDLC SERPL-MCNC: 161 MG/DL
PEF LLN: 7.85
PEF PRE REF: 105.3 %
PEF REF: 10.17
PH UR STRIP: 7 [PH] (ref 5–8)
PLATELET # BLD AUTO: 217 K/UL (ref 150–450)
PMV BLD AUTO: 9.3 FL (ref 9.2–12.9)
POTASSIUM SERPL-SCNC: 4.1 MMOL/L (ref 3.5–5.1)
PRE FEF 25 75: 3.47 L/S (ref 2.72–6.14)
PRE FET 100: 6.56 SEC
PRE FEV1 FVC: 79.37 % (ref 70.21–89.51)
PRE FEV1: 3.9 L (ref 3.34–5.05)
PRE FVC: 4.91 L (ref 4.17–6.33)
PRE PEF: 10.71 L/S (ref 7.85–12.49)
PROT SERPL-MCNC: 8.1 G/DL (ref 6–8.4)
PROT UR QL STRIP: NEGATIVE
RBC # BLD AUTO: 4.82 M/UL (ref 4.6–6.2)
SODIUM SERPL-SCNC: 141 MMOL/L (ref 136–145)
SP GR UR STRIP: 1.02 (ref 1–1.03)
TRIGL SERPL-MCNC: 97 MG/DL (ref 30–150)
TSH SERPL DL<=0.005 MIU/L-ACNC: 1.66 UIU/ML (ref 0.4–4)
URN SPEC COLLECT METH UR: NORMAL
WBC # BLD AUTO: 5.55 K/UL (ref 3.9–12.7)

## 2024-10-31 PROCEDURE — 80053 COMPREHEN METABOLIC PANEL: CPT | Performed by: FAMILY MEDICINE

## 2024-10-31 PROCEDURE — 83655 ASSAY OF LEAD: CPT | Performed by: FAMILY MEDICINE

## 2024-10-31 PROCEDURE — 84443 ASSAY THYROID STIM HORMONE: CPT | Performed by: FAMILY MEDICINE

## 2024-10-31 PROCEDURE — 81003 URINALYSIS AUTO W/O SCOPE: CPT | Performed by: FAMILY MEDICINE

## 2024-10-31 PROCEDURE — 99396 PREV VISIT EST AGE 40-64: CPT | Mod: ,,, | Performed by: FAMILY MEDICINE

## 2024-10-31 PROCEDURE — 99999 PR PBB SHADOW E&M-EST. PATIENT-LVL III: CPT | Mod: PBBFAC,,, | Performed by: FAMILY MEDICINE

## 2024-10-31 PROCEDURE — 85027 COMPLETE CBC AUTOMATED: CPT | Performed by: FAMILY MEDICINE

## 2024-10-31 PROCEDURE — 80061 LIPID PANEL: CPT | Performed by: FAMILY MEDICINE

## 2024-10-31 PROCEDURE — 83036 HEMOGLOBIN GLYCOSYLATED A1C: CPT | Performed by: FAMILY MEDICINE

## 2024-10-31 PROCEDURE — 84403 ASSAY OF TOTAL TESTOSTERONE: CPT | Performed by: FAMILY MEDICINE

## 2024-10-31 RX ORDER — PANTOPRAZOLE SODIUM 20 MG/1
20 TABLET, DELAYED RELEASE ORAL EVERY MORNING
Qty: 90 TABLET | Refills: 3 | Status: SHIPPED | OUTPATIENT
Start: 2024-10-31 | End: 2025-10-31

## 2024-10-31 NOTE — LETTER
Dear Fco,    It was a pleasure seeing you for your recent Executive Health Exam. I want to take a moment to acknowledge your incredible dedication and bravery as a  for the [employer] and an [employer] . Your tireless service to our community and country does not go unnoticed, and we are truly grateful for the sacrifices you make to keep us all safe.    This letter provides a summary of your Executive Health Exam and test results.    During your visit, we addressed several health concerns, including gastroesophageal reflux disease (GERD), low libido, and your family history of Cottos esophagus. Additionally, I ordered a testosterone test as part of your care plan.    Your vital signs at the exam were excellent. Your blood pressure was 117/84, which is within the healthy range of less than 120/80 mmHg, and your pulse was a steady 66 beats per minute. Your BMI of 21.52 kg/m² places you in the normal weight category, which ranges from 18.5 to 24.9. Historically, your vital signs, including blood pressure and weight, have remained stable, which is a testament to your commitment to your health. Maintaining this stability is a great achievement!    You reported a history of never using tobacco and limited alcohol consumption, with fewer than two drinks per month. These are excellent habits that significantly reduce your risk for a variety of health issues. Keep up the great work!    Your social history indicates a healthy, balanced approach to life, but I noted that stress is a concern. Managing occupational stress can be challenging. Regular exercise, mindfulness techniques, and prioritizing time for hobbies or family can help. If youd like, we can explore additional resources together.    Your current medications include:  Pantoprazole (Protonix) 20 mg tablet.    Lets review your lab results in detail:  Comprehensive Metabolic Panel (CMP): This panel evaluates your kidney and  liver function, electrolytes, and blood sugar levels. All your CMP values were within normal ranges. Your glucose level was 92 mg/dL (normal:  mg/dL), indicating healthy blood sugar regulation. Your kidney function markers, including BUN (20 mg/dL) and creatinine (1.2 mg/dL), were within normal limits, as were your liver enzymes. This indicates good metabolic and organ function.  Complete Blood Count (CBC): This test assesses red and white blood cells and platelets. Your results were unremarkable, with a hemoglobin level of 15.2 g/dL and a hematocrit of 44%, both within the healthy range. While your mean corpuscular hemoglobin (MCH) was slightly elevated at 31.5 pg (normal: 27.0-31.0 pg), this is not clinically concerning and does not require further action.  Lipid Panel: This panel measures your cholesterol and triglyceride levels. Your cholesterol was slightly elevated at 216 mg/dL (normal: 120-199 mg/dL). While this is something to monitor, the rest of your lipid panel was positive: your HDL (good cholesterol) was 55 mg/dL, and your triglycerides were 97 mg/dL, both in healthy ranges. These results suggest that although total cholesterol is mildly elevated, your cardiovascular risk remains manageable. A heart-healthy diet and regular exercise will help to keep your lipids in balance.  Urinalysis: This test screens for infections and other kidney or metabolic issues. All findings were normal, including a clear appearance and no abnormalities in protein, glucose, or blood.    Other individual lab test results:  Testosterone, Total: Your testosterone level was 417 ng/dL, which falls within the normal range for men (304-1227 ng/dL). This is a reassuring result and does not require further action at this time.  Blood Lead Level: Your lead level was <1.0 mcg/dL, well below the upper limit of 3.5 mcg/dL. This is excellent and indicates no concerning exposure to lead.  TSH (Thyroid-Stimulating Hormone): Your TSH  level was 1.655 uIU/mL (normal: 0.400-4.000 uIU/mL), indicating a healthy thyroid function.  Hemoglobin A1C: This measure of long-term blood sugar control was 4.9%, well within the normal range of 4.0-5.6%. This result shows no indication of diabetes or prediabetes.  Galleri® Cancer Screening Test: No cancer signal was detected. While this test is reassuring, it is not a substitute for routine cancer screenings, which you should continue as recommended.    Spirometry (pulmonary function tests) showed normal results. This indicates your lung function is healthy, which is especially important given the physical demands of your profession.    Your health maintenance items are up-to-date except for the influenza and COVID-19 vaccines, which were due in September. Please make plans to complete these as soon as possible and let me know if you need help scheduling.    Fco, you appear to be doing an excellent job taking care of your physical health and mental well-being. Keep up the good work!    As your primary care physician, I look forward to seeing you at your next Executive Health Exam. Please dont hesitate to reach out to me with any new concerns or questions about the results discussed here. You can also explore virtual and in-person care options through [facility] for your convenience.    Thank you for trusting me and [facility] with your healthcare needs. It is an honor to be part of your journey to optimal health.    Warmest regards,     OLGA Zamudio MD

## 2024-10-31 NOTE — PROGRESS NOTES
"[facility] ENCOUNTER  10/31/24      REASON FOR VISIT  [facility]    PCP (Primary Care Provider)  I am Fco's PCP.    HISTORY  History of Present Illness    Fco presents today for follow-up.    He reports weight loss of about 8 lbs. His current weight is 150 lbs, which is lower than his usual range of 155-158 lbs. This represents the lowest weight he has been in the past couple of years.    He is currently taking pantoprazole for acid reflux. He has discontinued sertraline and reports feeling good without it, attributing improvement in mood to nicotine gum use. He typically chews small amounts of the 4 mg gum, often consuming less than one piece over a few days. He notes increased usage during a recent two-week trip with [employer]. He expresses concern about a possible connection between nicotine gum use and increased stomach acidity, as well as recent weight loss. He denies significant concerns about potential harm from nicotine gum use, stating a preference for careful and balanced consumption at the lowest effective dose.    He reports a recent cardiovascular checkup at the [facility] in [place], including an ultrasound and a calcium CT. He states that the results were reported as normal, with healthcare providers indicating that "everything was good." Family history includes his grandfather having a stroke at age 93 due to 100% carotid artery blockage on one side, despite being very active and having normal labs prior to the event.    He reports an abdominal lump present for at least a year. The lump is sometimes pronounced and can feel inflamed. He denies pain but notes increased sensitivity in the area. He expresses concern about the possibility of a hernia or lymph node issue. He reports no changes in size or characteristics of the lump over time.       Assessment & Plan     Evaluated patient's weight loss, noting it as lowest in past few years but within " healthy BMI range   Assessed abdominal wall lump, determining it to be a likely benign cyst based on characteristics and lack of change over time   Ruled out malignancy based on physical exam, despite patient's concern about potential lymph node involvement   Reviewed recent cardiovascular tests reported by patient, though unable to access results due to proprietary system    L72.3 SEBACEOUS CYST:   Examined the patient and identified a small cyst, describing its characteristics.   Assessed that the cyst is not a cause for concern and does not require immediate treatment.   Explained the nature of cysts, their various compositions, and potential locations in the body.   Discussed the characteristics of normal lymph nodes versus pathological ones.   Advised the patient to monitor the persistent knot that has been present for at least a year.   Recommend follow-up if the abdominal wall lump starts growing or becomes bothersome.   Indicated that the first step in further evaluation would be an ultrasound.    F17.210 NICOTINE DEPENDENCE, CIGARETTES, UNCOMPLICATED:   Acknowledged the patient's use of nicotine gum as an alternative to other forms of nicotine or stimulants.   Advised the patient to continue using nicotine gum, emphasizing the importance of using the lowest effective dose.   Instructed the patient to monitor their nicotine dependence and report any changes in usage patterns.    K21.9 GASTRO-ESOPHAGEAL REFLUX DISEASE WITHOUT ESOPHAGITIS:   Noted the patient's reports of increased acidity and stomach discomfort.   Refilled the prescription for pantoprazole to manage ongoing acid reflux symptoms.   Instructed the patient to continue monitoring their gastroesophageal reflux disease symptoms and report any changes or worsening of condition.    LIFESTYLE CHANGES:   None Note: The following sentences were not directly related to the given ICD-10 codes and were not included in the groupings: - Fco to send PDF  "documents of test results from [facility] via [facility], labeling each document specifically in the subject line. - Ordered testosterone lab test. - Sent message to Suzanne regarding ordered testosterone lab.          ASSESSMENT/PLAN  1. Preventative health care    2. Gastroesophageal reflux disease without esophagitis  Overview:  EGD WNL 3/9/2022    Orders:  -     pantoprazole (PROTONIX) 20 MG tablet; Take 1 tablet (20 mg total) by mouth every morning.  Dispense: 90 tablet; Refill: 3    3. Low libido  -     Testosterone; Future; Expected date: 10/31/2024    4. Family history of Cotto's esophagus  Overview:  Twin brother has Cotto's.  Fco had EGD 3/9/2022: Normal          PHYSICAL EXAM  Vitals:    10/31/24 0947   BP: 117/84   BP Location: Right arm   Patient Position: Sitting   Pulse: 66   Temp: 98.2 °F (36.8 °C)   TempSrc: Tympanic   SpO2: 98%   Weight: 68 kg (150 lb)   Height: 5' 10" (1.778 m)   Physical Exam  Vitals reviewed.   Constitutional:       General: He is not in acute distress.     Appearance: Normal appearance. He is not ill-appearing, toxic-appearing or diaphoretic.   HENT:      Head: Normocephalic and atraumatic.      Right Ear: Tympanic membrane, ear canal and external ear normal.      Left Ear: Tympanic membrane, ear canal and external ear normal.   Eyes:      General: No scleral icterus.     Conjunctiva/sclera: Conjunctivae normal.   Neck:      Thyroid: No thyroid mass, thyromegaly or thyroid tenderness.      Vascular: No carotid bruit.   Cardiovascular:      Rate and Rhythm: Normal rate and regular rhythm.      Heart sounds: Normal heart sounds.   Pulmonary:      Effort: Pulmonary effort is normal.      Breath sounds: Normal breath sounds.   Abdominal:      General: Bowel sounds are normal. There is no distension.      Palpations: Abdomen is soft. There is no mass.      Tenderness: There is no abdominal tenderness.   Musculoskeletal:         General: No tenderness.      " Cervical back: No muscular tenderness.   Lymphadenopathy:      Cervical: No cervical adenopathy.   Skin:     General: Skin is warm and dry.      Coloration: Skin is not jaundiced.   Neurological:      General: No focal deficit present.      Mental Status: He is alert and oriented to person, place, and time. Mental status is at baseline.      Cranial Nerves: No cranial nerve deficit.      Gait: Gait normal.   Psychiatric:         Mood and Affect: Mood normal.         Behavior: Behavior normal.         Judgment: Judgment normal.         MEDICATIONS  Fco has a current medication list which includes the following prescription(s): pantoprazole.    HEALTH MAINTENANCE AND SCREENINGS - UP TO DATE  Health Maintenance Topics with due status: Not Due       Topic Last Completion Date    TETANUS VACCINE 01/01/2017    Lipid Panel 10/31/2024    RSV Vaccine (Age 60+ and Pregnant patients) Not Due     HEALTH MAINTENANCE AND SCREENINGS - DUE OR DUE SOON  Health Maintenance Due   Topic Date Due    Influenza Vaccine (1) 09/01/2024    COVID-19 Vaccine (3 - 2024-25 season) 09/01/2024     FOLLOW-UP  Fco is to follow up with me for any health problems or concerns, any abnormal test results, and any age-appropriate health maintenance interventions and screenings that may be due.    PROBLEM LIST  Fco has Frequent PVCs; Family history of Cotto's esophagus; Gastroesophageal reflux disease without esophagitis; Urinary hesitancy; Labral tear of shoulder, left, sequela; Traumatic arthritis of left foot; Multiple nevi; Mild bilateral hearing loss; Chronic neck pain; Low libido; Subcutaneous cyst left anterior thigh; and Anxiety on their problem list.    PAST MEDICAL HISTORY  Fco has a past medical history of Chronic neck pain, Gastroesophageal reflux disease without esophagitis, and GERD (gastroesophageal reflux disease).    SURGICAL HISTORY  Fco has a past surgical history that includes Appendectomy; Tonsillectomy; and  "Esophagogastroduodenoscopy (N/A, 3/9/2022).    FAMILY HISTORY  Fco family history includes Cancer in his paternal grandmother; Heart attack in his father and maternal grandfather; Heart disease in his mother; Hypertrophic cardiomyopathy in his mother; Liver disease in his mother.     ALLERGIES  Fco reports he has No Known Allergies.    SOCIAL HISTORY  Cfo  reports that he has never smoked. He has never used smokeless tobacco. He reports that he does not drink alcohol and does not use drugs.     This note was generated with the assistance of ambient listening technology. Verbal consent was obtained by the patient and accompanying visitor(s) for the recording of patient appointment to facilitate this note. I attest to having reviewed and edited the generated note for accuracy, though some syntax or spelling errors may persist. Please contact the author of this note for any clarification.    Documentation entered by me for this encounter may have been done in part using speech-recognition technology. Although I have made an effort to ensure accuracy, "sound like" errors may exist and should be interpreted in context.  "

## 2024-11-01 LAB — TESTOST SERPL-MCNC: 417 NG/DL (ref 304–1227)

## 2024-11-16 VITALS
HEIGHT: 70 IN | OXYGEN SATURATION: 98 % | HEART RATE: 66 BPM | TEMPERATURE: 98 F | DIASTOLIC BLOOD PRESSURE: 84 MMHG | SYSTOLIC BLOOD PRESSURE: 117 MMHG | BODY MASS INDEX: 21.47 KG/M2 | WEIGHT: 150 LBS

## 2024-12-03 ENCOUNTER — PATIENT MESSAGE (OUTPATIENT)
Dept: SPORTS MEDICINE | Facility: CLINIC | Age: 39
End: 2024-12-03
Payer: OTHER GOVERNMENT

## 2024-12-11 ENCOUNTER — OFFICE VISIT (OUTPATIENT)
Dept: OTOLARYNGOLOGY | Facility: CLINIC | Age: 39
End: 2024-12-11
Payer: OTHER GOVERNMENT

## 2024-12-11 VITALS — WEIGHT: 159.81 LBS | BODY MASS INDEX: 22.93 KG/M2

## 2024-12-11 DIAGNOSIS — H92.02 OTALGIA OF LEFT EAR: Primary | ICD-10-CM

## 2024-12-11 PROCEDURE — 99212 OFFICE O/P EST SF 10 MIN: CPT | Mod: PBBFAC,PO | Performed by: PHYSICIAN ASSISTANT

## 2024-12-11 PROCEDURE — 99203 OFFICE O/P NEW LOW 30 MIN: CPT | Mod: S$PBB,,, | Performed by: PHYSICIAN ASSISTANT

## 2024-12-11 PROCEDURE — 99999 PR PBB SHADOW E&M-EST. PATIENT-LVL II: CPT | Mod: PBBFAC,,, | Performed by: PHYSICIAN ASSISTANT
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
## 2024-12-11 NOTE — PROGRESS NOTES
Patient ID: Fco Millan  YOB: 1985  MRN: [id number]    Referred By: Dr. Robledo    Occupation: paramedic       History of Present Illness: Fco Millan is a right-hand dominant 39 y.o. male who presents today with Pain of the Right Shoulder and Pain of the Left Knee     History of Present Illness    HPI:  Fco presents with ongoing shoulder pain that has been progressively worsening. He indicates specific areas of discomfort. He reports that the pain is impeding his function and expresses frustration at not being able to work out for two months. He describes a pulling sensation when he tips out, along with pain that radiates upward and intensifies. He mentions feeling pain through a specific muscle group.    He reports experiencing pain with different movements and notes that it's consistently extending into his arm. He recounts waking up with pain the previous night, unsure if it was due to his sleeping position. He has not been taking any pain medication regularly, but mentions taking 600mg of Motrin on the day of the visit to manage the pain.    He expresses caution about taking certain medications due to a past experience with a medication (possibly ibuprofen 800mg) that caused severe stomach discomfort, leading him to believe he might have had an ulcer. He reports taking Protonix for stomach protection.    He mentions knee pain, stating that he feels more affected on one side than the other. He recalls having an MRI about five or six years ago that showed a slight tear in the meniscus from a fall. He describes difficulty with initial movement after sitting.    MEDICATIONS:  - Ibuprofen 800mg: Discontinued due to significant stomach irritation, possibly causing an ulcer  - Protonix (pantoprazole): For stomach protection    WORK STATUS:  - Currently working  - Shoulder pain impacting ability to function and perform activities  - May need to take time off work due to  condition  - Colleagues notice difficulty getting up after sitting    HISTORY:  - Torn labrum: Left shoulder, few years ago, surgery not performed       Previous HPI:  He was initially evaluated by Dr. Robledo on 10/17/24 with right anterior shoulder pain since August without injury.   He was diagnosed with right shoulder biceps tendonitis and referred to [facility] to consider US guided biceps tendon sheath injection.  He was seen in our clinic on 10/22/24 where he received right SA and biceps CSI's.    Past Medical History:   Past Medical History:   Diagnosis Date    Chronic neck pain 4/24/2023    Gastroesophageal reflux disease without esophagitis 3/11/2021    GERD (gastroesophageal reflux disease)      Past Surgical History:   Procedure Laterality Date    APPENDECTOMY      ESOPHAGOGASTRODUODENOSCOPY N/A 3/9/2022    Procedure: ESOPHAGOGASTRODUODENOSCOPY (EGD);  Surgeon: Sarahi West MD;  Location: [facility];  Service: Endoscopy;  Laterality: N/A;    TONSILLECTOMY       Family History   Problem Relation Name Age of Onset    Liver disease Mother      Heart disease Mother      Hypertrophic cardiomyopathy Mother      Heart attack Father      Cancer Paternal Grandmother      Heart attack Maternal Grandfather       Social History     Socioeconomic History    Marital status:    Tobacco Use    Smoking status: Never    Smokeless tobacco: Never   Substance and Sexual Activity    Alcohol use: No     Alcohol/week: 0.0 standard drinks of alcohol    Drug use: No    Sexual activity: Yes     Partners: Female     Social Drivers of Health     Financial Resource Strain: Low Risk  (9/3/2024)    Overall Financial Resource Strain (CARDIA)     Difficulty of Paying Living Expenses: Not hard at all   Food Insecurity: No Food Insecurity (9/3/2024)    Hunger Vital Sign     Worried About Running Out of Food in the Last Year: Never true     Ran Out of Food in the Last Year: Never true   Physical Activity: Insufficiently Active  (9/3/2024)    Exercise Vital Sign     Days of Exercise per Week: 3 days     Minutes of Exercise per Session: 40 min   Stress: Stress Concern Present (9/3/2024)    [employer] - Occupational Stress Questionnaire     Feeling of Stress : Rather much   Housing Stability: Unknown (9/3/2024)    Housing Stability Vital Sign     Unable to Pay for Housing in the Last Year: No     Medication List with Changes/Refills   New Medications    CELECOXIB (CELEBREX) 100 MG CAPSULE    Take 1 capsule (100 mg total) by mouth 2 (two) times daily.   Current Medications    PANTOPRAZOLE (PROTONIX) 20 MG TABLET    Take 1 tablet (20 mg total) by mouth every morning.     Review of patient's allergies indicates:  No Known Allergies    Physical Exam:   Body mass index is 22.93 kg/m².    Detailed MSK exam:     Right Shoulder:  Inspection:  No swelling, erythema or ecchymosis.   Palpation: No significant tenderness to palpation  Range of motion: 140 deg Flexion - contralateral 165         90 deg External Rotation in Adduction         IR to Upper Thoracic  Strength:  5-/5 Abduction    5-/5 External Rotation in Adduction    5/5 Internal Rotation   Special Tests: Positive Sands-Everton    Positive Neer's    Positive Speed's    Positive Empty Can  Equivocal Norwood's   N/V Exam:  Radial: Normal motor (EPL/thumbs up)              Normal sensory (dorsal hand)   Median: Normal motor (FPL/A-OK)      Normal sensory (thumb)   Ulnar:  Normal motor (Interossei/scissors-spread)     Normal sensory (5th finger)   LABC: Normal sensory (lateral forearm)   MABC: Normal sensory (medial forearm)   MC: Normal motor (elbow flexion)   Axillary: Normal motor/sensory (deltoid)  Normal radial and ulnar pulses, warm and well perfused with capillary refill < 2 sec      Imaging:  X-ray Shoulder 2 or More Views Right  Narrative: EXAMINATION:  XR SHOULDER COMPLETE 2 OR MORE VIEWS RIGHT    CLINICAL HISTORY:  Pain in right  shoulder    TECHNIQUE:  Two or three views of the right shoulder were preformed.    COMPARISON:  04/16/2021    FINDINGS:  No acute fracture or dislocation.  AC joint and glenohumeral joint spaces are well maintained.  Visualized lung parenchyma is clear.  Impression: 1.  As above    Electronically signed by: Luis M Lewis DO  Date:    10/17/2024  Time:    13:34      Patient Instructions   Assessment:  Fco Millan is a 39 y.o. male with a chief complaint of Pain of the Right Shoulder and Pain of the Left Knee    Encounter Diagnoses   Name Primary?    Biceps tendonitis on right Yes    Impingement syndrome of right shoulder     Chronic right shoulder pain       Plan:  Patient with persistent right shoulder pain, now 6 weeks out from subacromial bursa and biceps tendon sheath corticosteroid injection, which provided minimal relief.  He is still having quite a bit of pain, discomfort, restrictions given the pain.  Given lack improvement with conservative measures thus far, including NSAIDs, rest, corticosteroid injection, would recommend MRI without contrast for further evaluation.  In the meantime, we will switch anti-inflammatory to Celebrex 100 mg, to be taken twice daily with food.    Follow-up: Will call with MRI results or sooner if there are any problems between now and then.    Thank you for choosing [facility] and Dr. Alec Gibbons for your orthopedic & sports medicine care. It is our goal to provide you with exceptional care that will help keep you healthy, active, and get you back in the game.    Please do not hesitate to reach out to us via email, phone, or MyChart with any questions, concerns, or feedback.    If you are experiencing pain/discomfort ,or have questions after 5pm and would like to be connected to the [facility] on-call team, please call this number and specify which Sports Medicine provider is treating you: [phone]        A copy of today's visit note has been sent to the referring provider.           Alec Gibbons MD  Primary Care Sports Medicine    Disclaimer: This note was prepared using a voice recognition system and is likely to have sound alike errors within the text.     This note was generated with the assistance of ambient listening technology. Verbal consent was obtained by the patient and accompanying visitor(s) for the recording of patient appointment to facilitate this note. I attest to having reviewed and edited the generated note for accuracy, though some syntax or spelling errors may persist. Please contact the author of this note for any clarification.

## 2024-12-13 ENCOUNTER — OFFICE VISIT (OUTPATIENT)
Dept: SPORTS MEDICINE | Facility: CLINIC | Age: 39
End: 2024-12-13
Payer: OTHER GOVERNMENT

## 2024-12-13 VITALS — BODY MASS INDEX: 22.88 KG/M2 | WEIGHT: 159.81 LBS | HEIGHT: 70 IN

## 2024-12-13 DIAGNOSIS — M75.21 BICEPS TENDONITIS ON RIGHT: Primary | ICD-10-CM

## 2024-12-13 DIAGNOSIS — M75.41 IMPINGEMENT SYNDROME OF RIGHT SHOULDER: ICD-10-CM

## 2024-12-13 DIAGNOSIS — G89.29 CHRONIC RIGHT SHOULDER PAIN: ICD-10-CM

## 2024-12-13 DIAGNOSIS — M25.511 CHRONIC RIGHT SHOULDER PAIN: ICD-10-CM

## 2024-12-13 PROCEDURE — 99214 OFFICE O/P EST MOD 30 MIN: CPT | Mod: S$PBB,,, | Performed by: STUDENT IN AN ORGANIZED HEALTH CARE EDUCATION/TRAINING PROGRAM

## 2024-12-13 PROCEDURE — 99213 OFFICE O/P EST LOW 20 MIN: CPT | Mod: PBBFAC,PO | Performed by: STUDENT IN AN ORGANIZED HEALTH CARE EDUCATION/TRAINING PROGRAM

## 2024-12-13 PROCEDURE — 99999 PR PBB SHADOW E&M-EST. PATIENT-LVL III: CPT | Mod: PBBFAC,,, | Performed by: STUDENT IN AN ORGANIZED HEALTH CARE EDUCATION/TRAINING PROGRAM

## 2024-12-13 RX ORDER — CELECOXIB 100 MG/1
100 CAPSULE ORAL 2 TIMES DAILY
Qty: 60 CAPSULE | Refills: 1 | Status: SHIPPED | OUTPATIENT
Start: 2024-12-13

## 2024-12-13 NOTE — PATIENT INSTRUCTIONS
Assessment:  Fco Millan is a 39 y.o. male with a chief complaint of Pain of the Right Shoulder and Pain of the Left Knee    Encounter Diagnoses   Name Primary?    Biceps tendonitis on right Yes    Impingement syndrome of right shoulder     Chronic right shoulder pain       Plan:  Patient with persistent right shoulder pain, now 6 weeks out from subacromial bursa and biceps tendon sheath corticosteroid injection, which provided minimal relief.  He is still having quite a bit of pain, discomfort, restrictions given the pain.  Given lack improvement with conservative measures thus far, including NSAIDs, rest, corticosteroid injection, would recommend MRI without contrast for further evaluation.  In the meantime, we will switch anti-inflammatory to Celebrex 100 mg, to be taken twice daily with food.    Follow-up: Will call with MRI results or sooner if there are any problems between now and then.    Thank you for choosing [facility] and Dr. Alec Gibbons for your orthopedic & sports medicine care. It is our goal to provide you with exceptional care that will help keep you healthy, active, and get you back in the game.    Please do not hesitate to reach out to us via email, phone, or MyChart with any questions, concerns, or feedback.    If you are experiencing pain/discomfort ,or have questions after 5pm and would like to be connected to the [facility] on-call team, please call this number and specify which Sports Medicine provider is treating you: [phone]

## 2024-12-19 ENCOUNTER — HOSPITAL ENCOUNTER (OUTPATIENT)
Dept: RADIOLOGY | Facility: HOSPITAL | Age: 39
Discharge: HOME | End: 2024-12-19
Attending: STUDENT IN AN ORGANIZED HEALTH CARE EDUCATION/TRAINING PROGRAM
Payer: OTHER GOVERNMENT

## 2024-12-19 DIAGNOSIS — M25.511 CHRONIC RIGHT SHOULDER PAIN: ICD-10-CM

## 2024-12-19 DIAGNOSIS — G89.29 CHRONIC RIGHT SHOULDER PAIN: ICD-10-CM

## 2024-12-19 PROCEDURE — 73221 MRI JOINT UPR EXTREM W/O DYE: CPT | Mod: TC,PN,RT

## 2024-12-19 PROCEDURE — 73221 MRI JOINT UPR EXTREM W/O DYE: CPT | Mod: 26,RT,, | Performed by: RADIOLOGY

## 2024-12-23 ENCOUNTER — PATIENT MESSAGE (OUTPATIENT)
Dept: SPORTS MEDICINE | Facility: CLINIC | Age: 39
End: 2024-12-23
Payer: OTHER GOVERNMENT

## 2024-12-27 NOTE — PROGRESS NOTES
Patient ID: Fco Millan  YOB: 1985  MRN: [id number]    Referred By: Dr. Robledo    Occupation: paramedic       History of Present Illness: Fco Millan is a right-hand dominant 39 y.o. male who presents today with Pain of the Right Shoulder     History of Present Illness    HPI:  Fco presents with worsening right shoulder pain, particularly severe when moving the shoulder in certain ways. He describes it as severe pain that takes about 30 seconds before he can move again. He recounts a recent incident in [place] where he attempted to catch his wife when she slipped, exacerbating his shoulder pain. He was unable to move due to intense pain. The pain is now affecting more movements, such as when his radio strap caught on a door at work, pulling his arm back uncomfortably.    He expresses concern about the impact on his work, indicating a need for change in his current situation. He mentions a history of a frozen shoulder in the same area previously and has limited range of motion, particularly when trying to reach behind his head. He has been attempting some stretching exercises on his own, including using a device that he remembers from previous treatment. He expresses interest in understanding the long-term prognosis for his shoulder, inquiring about the possibility of eventual joint replacement.    He denies any relief from previous treatments targeting biceps and rotator cuff. He denies any major tears in the shoulder based on recent MRI findings.    WORK STATUS:  - Currently working  - Job performance affected by shoulder pain  - Attempting to conceal pain at work  - Concerned about potential mandatory time off due to condition    HISTORY:  - Marital Status:   - Zoroastrian: Attends [employer]  - Previously owned a lawn care business for 20 years       Previous HPI:  He was initially evaluated by Dr. Robledo on 10/17/24 with right anterior shoulder pain since August without  injury.   He was diagnosed with right shoulder biceps tendonitis and referred to [facility] to consider US guided biceps tendon sheath injection.  He was seen in our clinic on 10/22/24 where he received right SA and biceps CSI's.  At his last visit on 12/13/24, he reported persistent right shoulder pain.  He was prescribed Celebrex 100mg and referred for an MRI of the right shoulder.    Past Medical History:   Past Medical History:   Diagnosis Date    Chronic neck pain 4/24/2023    Gastroesophageal reflux disease without esophagitis 3/11/2021    GERD (gastroesophageal reflux disease)      Past Surgical History:   Procedure Laterality Date    APPENDECTOMY      ESOPHAGOGASTRODUODENOSCOPY N/A 3/9/2022    Procedure: ESOPHAGOGASTRODUODENOSCOPY (EGD);  Surgeon: Sarahi West MD;  Location: [facility];  Service: Endoscopy;  Laterality: N/A;    TONSILLECTOMY       Family History   Problem Relation Name Age of Onset    Liver disease Mother      Heart disease Mother      Hypertrophic cardiomyopathy Mother      Heart attack Father      Cancer Paternal Grandmother      Heart attack Maternal Grandfather       Social History     Socioeconomic History    Marital status:    Tobacco Use    Smoking status: Never    Smokeless tobacco: Never   Substance and Sexual Activity    Alcohol use: No     Alcohol/week: 0.0 standard drinks of alcohol    Drug use: No    Sexual activity: Yes     Partners: Female     Social Drivers of Health     Financial Resource Strain: Low Risk  (9/3/2024)    Overall Financial Resource Strain (CARDIA)     Difficulty of Paying Living Expenses: Not hard at all   Food Insecurity: No Food Insecurity (9/3/2024)    Hunger Vital Sign     Worried About Running Out of Food in the Last Year: Never true     Ran Out of Food in the Last Year: Never true   Physical Activity: Insufficiently Active (9/3/2024)    Exercise Vital Sign     Days of Exercise per Week: 3 days     Minutes of Exercise per Session: 40 min   Stress:  Stress Concern Present (9/3/2024)    [employer] - Occupational Stress Questionnaire     Feeling of Stress : Rather much   Housing Stability: Unknown (9/3/2024)    Housing Stability Vital Sign     Unable to Pay for Housing in the Last Year: No     Medication List with Changes/Refills   Current Medications    CELECOXIB (CELEBREX) 100 MG CAPSULE    Take 1 capsule (100 mg total) by mouth 2 (two) times daily.    PANTOPRAZOLE (PROTONIX) 20 MG TABLET    Take 1 tablet (20 mg total) by mouth every morning.     Review of patient's allergies indicates:  No Known Allergies    Physical Exam:   There is no height or weight on file to calculate BMI.    Detailed MSK exam:     Right Shoulder:  Inspection:  No swelling, erythema or ecchymosis.   Palpation: No significant tenderness to palpation  Range of motion: 140 deg Flexion - contralateral 165         90 deg External Rotation in Adduction         IR to Upper Thoracic  Strength:  5-/5 Abduction    5-/5 External Rotation in Adduction    5/5 Internal Rotation   Special Tests: Positive Sands-Everton    Positive Neer's    Positive Speed's    Positive Empty Can  Equivocal Christian's   N/V Exam:  Radial: Normal motor (EPL/thumbs up)              Normal sensory (dorsal hand)   Median: Normal motor (FPL/A-OK)      Normal sensory (thumb)   Ulnar:  Normal motor (Interossei/scissors-spread)     Normal sensory (5th finger)   LABC: Normal sensory (lateral forearm)   MABC: Normal sensory (medial forearm)   MC: Normal motor (elbow flexion)   Axillary: Normal motor/sensory (deltoid)  Normal radial and ulnar pulses, warm and well perfused with capillary refill < 2 sec      Imaging:  MRI Shoulder Without Contrast Right  Narrative: EXAM: MRI SHOULDER WITHOUT CONTRAST RIGHT    CLINICAL HISTORY:  Right shoulder pain and limited range of motion.    COMPARISON:  Right shoulder x-ray on 10/17/2024    TECHNIQUE:  Multiplanar, multisequence MR imaging was performed through  the right shoulder without intravenous or intra-articular gadolinium contrast.    FINDINGS:    The acromioclavicular joint is within normal limits. There is a type 1  acromion.    Mild rotator cuff tendinosis.  No full-thickness cuff tear.    The biceps tendon is intact.    The biceps labral complex is intact.   No labral tears.    Thickening and high intrasubstance signal throughout the inferior glenohumeral ligament.  Small joint effusion.  Marked medial humeral head chondral thinning.  Impression: Rotator cuff tendinosis.    Adhesive capsulitis.    Finalized on: 12/19/2024 9:00 PM By:  MIKEL Godoy MD, MD  [facility]# [id number]      2024-12-19 21:02:[id number]    [facility]    Relevant imaging results were reviewed and interpreted by me and per my read:  Mild tendinosis of the rotator cuff.  No significant tearing.  Normal biceps tendon.  No SLAP tear.    This was discussed with the patient and / or family today.      Patient Instructions   Assessment:  Fco Millan is a 39 y.o. male with a chief complaint of Pain of the Right Shoulder    Encounter Diagnoses   Name Primary?    Chronic right shoulder pain     Adhesive capsulitis of right shoulder Yes      Plan:  MRI reviewed - mild cuff tendinosis, no significant tearing  History and clinical exam is consistent with adhesive capsulitis of the right shoulder  Right glenohumeral joint corticosteroid injection today.    Proper protocols after the injection included: no submerging pools, baths tubs, or hot tubs for 24 hr.  Showering is okay today.  Side effects of the corticosteroid injection can include elevated blood glucose levels and blood pressures, so if you are taking medications for these, please monitor closely, and contact your PCP if any issues.  Red flag symptoms include fever, chills, nausea, vomiting, red, warm, tender joint at the area of injection.  If you are noticing these symptoms, they may be indicative of an infection, and please seek medical care  immediately, either by calling our clinic or going to the emergency room.  Home exercise program given today.   At least 15 minutes were spent developing, teaching, and performing a home exercise program under the direction of Alec Gibbons MD.  A written summary was provided and all questions were answered.    Follow-up:  As needed or sooner if there are any problems between now and then.    Thank you for choosing [facility] and Dr. Alec Gibbons for your orthopedic & sports medicine care. It is our goal to provide you with exceptional care that will help keep you healthy, active, and get you back in the game.    Please do not hesitate to reach out to us via email, phone, or MyChart with any questions, concerns, or feedback.    If you are experiencing pain/discomfort ,or have questions after 5pm and would like to be connected to the [facility] on-call team, please call this number and specify which Sports Medicine provider is treating you: [phone]      A copy of today's visit note has been sent to the referring provider.           Alec Gibbons MD  Primary Care Sports Medicine    Disclaimer: This note was prepared using a voice recognition system and is likely to have sound alike errors within the text.     This note was generated with the assistance of ambient listening technology. Verbal consent was obtained by the patient and accompanying visitor(s) for the recording of patient appointment to facilitate this note. I attest to having reviewed and edited the generated note for accuracy, though some syntax or spelling errors may persist. Please contact the author of this note for any clarification.

## 2025-01-06 ENCOUNTER — OFFICE VISIT (OUTPATIENT)
Dept: SPORTS MEDICINE | Facility: CLINIC | Age: 40
End: 2025-01-06
Payer: OTHER GOVERNMENT

## 2025-01-06 DIAGNOSIS — M75.01 ADHESIVE CAPSULITIS OF RIGHT SHOULDER: Primary | ICD-10-CM

## 2025-01-06 DIAGNOSIS — G89.29 CHRONIC RIGHT SHOULDER PAIN: ICD-10-CM

## 2025-01-06 DIAGNOSIS — M25.511 CHRONIC RIGHT SHOULDER PAIN: ICD-10-CM

## 2025-01-06 PROCEDURE — 99999 PR PBB SHADOW E&M-EST. PATIENT-LVL III: CPT | Mod: PBBFAC,,, | Performed by: STUDENT IN AN ORGANIZED HEALTH CARE EDUCATION/TRAINING PROGRAM

## 2025-01-06 PROCEDURE — 99214 OFFICE O/P EST MOD 30 MIN: CPT | Mod: 25,S$PBB,, | Performed by: STUDENT IN AN ORGANIZED HEALTH CARE EDUCATION/TRAINING PROGRAM

## 2025-01-06 PROCEDURE — 99213 OFFICE O/P EST LOW 20 MIN: CPT | Mod: PBBFAC,PN | Performed by: STUDENT IN AN ORGANIZED HEALTH CARE EDUCATION/TRAINING PROGRAM

## 2025-01-06 PROCEDURE — 99999PBSHW PR PBB SHADOW TECHNICAL ONLY FILED TO HB: Mod: PBBFAC,,,

## 2025-01-06 PROCEDURE — 20611 DRAIN/INJ JOINT/BURSA W/US: CPT | Mod: PBBFAC,PN | Performed by: STUDENT IN AN ORGANIZED HEALTH CARE EDUCATION/TRAINING PROGRAM

## 2025-01-06 RX ORDER — TRIAMCINOLONE ACETONIDE 40 MG/ML
40 INJECTION, SUSPENSION INTRA-ARTICULAR; INTRAMUSCULAR
Status: DISCONTINUED | OUTPATIENT
Start: 2025-01-06 | End: 2025-01-06 | Stop reason: HOSPADM

## 2025-01-06 RX ADMIN — TRIAMCINOLONE ACETONIDE 40 MG: 200 INJECTION, SUSPENSION INTRA-ARTICULAR; INTRAMUSCULAR at 02:01

## 2025-01-06 NOTE — PATIENT INSTRUCTIONS
Assessment:  Fco Millan is a 39 y.o. male with a chief complaint of Pain of the Right Shoulder    Encounter Diagnoses   Name Primary?    Chronic right shoulder pain     Adhesive capsulitis of right shoulder Yes      Plan:  MRI reviewed - mild cuff tendinosis, no significant tearing  History and clinical exam is consistent with adhesive capsulitis of the right shoulder  Right glenohumeral joint corticosteroid injection today.    Proper protocols after the injection included: no submerging pools, baths tubs, or hot tubs for 24 hr.  Showering is okay today.  Side effects of the corticosteroid injection can include elevated blood glucose levels and blood pressures, so if you are taking medications for these, please monitor closely, and contact your PCP if any issues.  Red flag symptoms include fever, chills, nausea, vomiting, red, warm, tender joint at the area of injection.  If you are noticing these symptoms, they may be indicative of an infection, and please seek medical care immediately, either by calling our clinic or going to the emergency room.  Home exercise program given today.   At least 15 minutes were spent developing, teaching, and performing a home exercise program under the direction of Alec Gibbons MD.  A written summary was provided and all questions were answered.    Follow-up:  As needed or sooner if there are any problems between now and then.    Thank you for choosing [facility] and Dr. Alec Gibbons for your orthopedic & sports medicine care. It is our goal to provide you with exceptional care that will help keep you healthy, active, and get you back in the game.    Please do not hesitate to reach out to us via email, phone, or MyChart with any questions, concerns, or feedback.    If you are experiencing pain/discomfort ,or have questions after 5pm and would like to be connected to the [facility] on-call team, please call  this number and specify which Sports Medicine provider is treating you: [phone]

## 2025-01-06 NOTE — PROCEDURES
Large Joint Aspiration/Injection: R glenohumeral    Date/Time: 1/6/2025 2:00 PM    Performed by: Alec Gibbons MD  Authorized by: Alec Gibbons MD    Consent Done?:  Yes (Verbal)  Indications:  Arthritis and pain  Site marked: the procedure site was marked    Timeout: prior to procedure the correct patient, procedure, and site was verified      Local anesthesia used?: Yes    Anesthesia:  Local infiltration  Local anesthetic:  Bupivacaine 0.5% without epinephrine, lidocaine 1% without epinephrine and topical anesthetic  Anesthetic total (ml):  8      Details:  Needle Size:  22 G  Ultrasonic Guidance for needle placement?: Yes    Images are saved and documented.  Approach:  Posterior  Location:  Shoulder  Site:  R glenohumeral  Medications:  40 mg triamcinolone acetonide 40 mg/mL  Patient tolerance:  Patient tolerated the procedure well with no immediate complications     Ultrasound guidance was used for needle localization. Images were saved and stored for documentation. The appropriate structures were visualized. Dynamic visualization of the needle was continuous throughout the procedures and maintained good position.     We discussed the proper protocols after the injection such as no submerging pools, baths tubs, or hot tubs for 24 hr.  Showering is okay today.  We also discussed that blood sugars can be elevated after an injection and asked patient to properly check their sugars over the next few days and contact their PCP if there are any concerns.  We discussed red flags such as fevers, chills, red, warm, tender joint at the area of injection to please seek medical care immediately.

## 2025-02-12 DIAGNOSIS — Z00.00 ROUTINE GENERAL MEDICAL EXAMINATION AT A HEALTH CARE FACILITY: Primary | ICD-10-CM

## 2025-03-21 ENCOUNTER — HOSPITAL ENCOUNTER (OUTPATIENT)
Dept: CARDIOLOGY | Facility: HOSPITAL | Age: 40
Discharge: HOME | End: 2025-03-21
Attending: FAMILY MEDICINE

## 2025-03-21 ENCOUNTER — CLINICAL SUPPORT (OUTPATIENT)
Dept: INTERNAL MEDICINE | Facility: CLINIC | Age: 40
End: 2025-03-21

## 2025-03-21 ENCOUNTER — CLINICAL SUPPORT (OUTPATIENT)
Dept: AUDIOLOGY | Facility: CLINIC | Age: 40
End: 2025-03-21

## 2025-03-21 ENCOUNTER — CLINICAL SUPPORT (OUTPATIENT)
Dept: INTERNAL MEDICINE | Facility: CLINIC | Age: 40
End: 2025-03-21
Payer: OTHER GOVERNMENT

## 2025-03-21 ENCOUNTER — CLINICAL SUPPORT (OUTPATIENT)
Dept: PULMONOLOGY | Facility: CLINIC | Age: 40
End: 2025-03-21

## 2025-03-21 ENCOUNTER — OFFICE VISIT (OUTPATIENT)
Dept: INTERNAL MEDICINE | Facility: CLINIC | Age: 40
End: 2025-03-21

## 2025-03-21 VITALS
BODY MASS INDEX: 23.95 KG/M2 | DIASTOLIC BLOOD PRESSURE: 78 MMHG | HEART RATE: 65 BPM | WEIGHT: 158 LBS | HEIGHT: 68 IN | SYSTOLIC BLOOD PRESSURE: 122 MMHG | TEMPERATURE: 98 F

## 2025-03-21 DIAGNOSIS — Z00.00 ROUTINE GENERAL MEDICAL EXAMINATION AT A HEALTH CARE FACILITY: ICD-10-CM

## 2025-03-21 DIAGNOSIS — R68.82 LOW LIBIDO: Chronic | ICD-10-CM

## 2025-03-21 DIAGNOSIS — Z00.00 ROUTINE GENERAL MEDICAL EXAMINATION AT A HEALTH CARE FACILITY: Primary | ICD-10-CM

## 2025-03-21 DIAGNOSIS — M75.01 ADHESIVE CAPSULITIS OF RIGHT SHOULDER: ICD-10-CM

## 2025-03-21 DIAGNOSIS — Z01.12 HEARING CONSERVATION AND TREATMENT EXAM: Primary | ICD-10-CM

## 2025-03-21 DIAGNOSIS — K21.9 GASTROESOPHAGEAL REFLUX DISEASE WITHOUT ESOPHAGITIS: Chronic | ICD-10-CM

## 2025-03-21 DIAGNOSIS — Z00.00 PREVENTATIVE HEALTH CARE: Primary | ICD-10-CM

## 2025-03-21 LAB
ALBUMIN SERPL BCP-MCNC: 4.3 G/DL (ref 3.5–5.2)
ALP SERPL-CCNC: 57 U/L (ref 40–150)
ALT SERPL W/O P-5'-P-CCNC: 20 U/L (ref 10–44)
ANION GAP SERPL CALC-SCNC: 10 MMOL/L (ref 8–16)
AST SERPL-CCNC: 20 U/L (ref 10–40)
BILIRUB SERPL-MCNC: 0.8 MG/DL (ref 0.1–1)
BILIRUB UR QL STRIP: NEGATIVE
BRPFT: NORMAL
BUN SERPL-MCNC: 16 MG/DL (ref 6–20)
CALCIUM SERPL-MCNC: 9.3 MG/DL (ref 8.7–10.5)
CHLORIDE SERPL-SCNC: 109 MMOL/L (ref 95–110)
CHOLEST SERPL-MCNC: 180 MG/DL (ref 120–199)
CHOLEST/HDLC SERPL: 3.5 {RATIO} (ref 2–5)
CLARITY UR: CLEAR
CO2 SERPL-SCNC: 25 MMOL/L (ref 23–29)
COLOR UR: YELLOW
COMPLEXED PSA SERPL-MCNC: 0.98 NG/ML (ref 0–4)
CREAT SERPL-MCNC: 1 MG/DL (ref 0.5–1.4)
ERYTHROCYTE [DISTWIDTH] IN BLOOD BY AUTOMATED COUNT: 11.7 % (ref 11.5–14.5)
EST. GFR  (NO RACE VARIABLE): >60 ML/MIN/1.73 M^2
ESTIMATED AVG GLUCOSE: 97 MG/DL (ref 68–131)
FEF 25 75 LLN: 2.72
FEF 25 75 PRE REF: 68.1 %
FEF 25 75 REF: 4.43
FEV1 FVC LLN: 70
FEV1 FVC PRE REF: 94.6 %
FEV1 FVC REF: 81
FEV1 LLN: 3.33
FEV1 PRE REF: 92 %
FEV1 REF: 4.2
FVC LLN: 4.16
FVC PRE REF: 96.8 %
FVC REF: 5.23
GLUCOSE SERPL-MCNC: 93 MG/DL (ref 70–110)
GLUCOSE UR QL STRIP: NEGATIVE
HBA1C MFR BLD: 5 % (ref 4–5.6)
HCT VFR BLD AUTO: 40.9 % (ref 40–54)
HDLC SERPL-MCNC: 52 MG/DL (ref 40–75)
HDLC SERPL: 28.9 % (ref 20–50)
HGB BLD-MCNC: 14.1 G/DL (ref 14–18)
HGB UR QL STRIP: NEGATIVE
KETONES UR QL STRIP: NEGATIVE
LDLC SERPL CALC-MCNC: 114 MG/DL (ref 63–159)
LEUKOCYTE ESTERASE UR QL STRIP: NEGATIVE
MCH RBC QN AUTO: 31.2 PG (ref 27–31)
MCHC RBC AUTO-ENTMCNC: 34.5 G/DL (ref 32–36)
MCV RBC AUTO: 91 FL (ref 82–98)
NITRITE UR QL STRIP: NEGATIVE
NONHDLC SERPL-MCNC: 128 MG/DL
OHS QRS DURATION: 94 MS
OHS QTC CALCULATION: 380 MS
PEF LLN: 7.85
PEF PRE REF: 117.8 %
PEF REF: 10.17
PH UR STRIP: 7 [PH] (ref 5–8)
PLATELET # BLD AUTO: 186 K/UL (ref 150–450)
PMV BLD AUTO: 9.2 FL (ref 9.2–12.9)
POTASSIUM SERPL-SCNC: 4.3 MMOL/L (ref 3.5–5.1)
PRE FEF 25 75: 3.02 L/S (ref 2.72–6.14)
PRE FET 100: 8.86 SEC
PRE FEV1 FVC: 76.21 % (ref 70.14–89.44)
PRE FEV1: 3.86 L (ref 3.33–5.04)
PRE FVC: 5.07 L (ref 4.16–6.32)
PRE PEF: 11.98 L/S (ref 7.85–12.49)
PROT SERPL-MCNC: 7.1 G/DL (ref 6–8.4)
PROT UR QL STRIP: NEGATIVE
RBC # BLD AUTO: 4.52 M/UL (ref 4.6–6.2)
SODIUM SERPL-SCNC: 144 MMOL/L (ref 136–145)
SP GR UR STRIP: 1.01 (ref 1–1.03)
TESTOST SERPL-MCNC: 719 NG/DL (ref 304–1227)
TRIGL SERPL-MCNC: 70 MG/DL (ref 30–150)
TSH SERPL DL<=0.005 MIU/L-ACNC: 1.04 UIU/ML (ref 0.4–4)
URN SPEC COLLECT METH UR: NORMAL
WBC # BLD AUTO: 4.72 K/UL (ref 3.9–12.7)

## 2025-03-21 PROCEDURE — 99396 PREV VISIT EST AGE 40-64: CPT | Mod: ,,, | Performed by: FAMILY MEDICINE

## 2025-03-21 PROCEDURE — 84443 ASSAY THYROID STIM HORMONE: CPT | Performed by: FAMILY MEDICINE

## 2025-03-21 PROCEDURE — 93005 ELECTROCARDIOGRAM TRACING: CPT

## 2025-03-21 PROCEDURE — 83036 HEMOGLOBIN GLYCOSYLATED A1C: CPT | Performed by: FAMILY MEDICINE

## 2025-03-21 PROCEDURE — 84153 ASSAY OF PSA TOTAL: CPT | Performed by: FAMILY MEDICINE

## 2025-03-21 PROCEDURE — 80061 LIPID PANEL: CPT | Performed by: FAMILY MEDICINE

## 2025-03-21 PROCEDURE — 93010 ELECTROCARDIOGRAM REPORT: CPT | Mod: ,,, | Performed by: INTERNAL MEDICINE

## 2025-03-21 PROCEDURE — 99999 PR PBB SHADOW E&M-EST. PATIENT-LVL I: CPT | Mod: PBBFAC,,, | Performed by: AUDIOLOGIST

## 2025-03-21 PROCEDURE — 83655 ASSAY OF LEAD: CPT | Performed by: FAMILY MEDICINE

## 2025-03-21 PROCEDURE — 85027 COMPLETE CBC AUTOMATED: CPT | Performed by: FAMILY MEDICINE

## 2025-03-21 PROCEDURE — 80053 COMPREHEN METABOLIC PANEL: CPT | Performed by: FAMILY MEDICINE

## 2025-03-21 PROCEDURE — 81003 URINALYSIS AUTO W/O SCOPE: CPT | Performed by: FAMILY MEDICINE

## 2025-03-21 PROCEDURE — 99999 PR PBB SHADOW E&M-EST. PATIENT-LVL III: CPT | Mod: PBBFAC,,, | Performed by: FAMILY MEDICINE

## 2025-03-21 PROCEDURE — 84403 ASSAY OF TOTAL TESTOSTERONE: CPT | Performed by: FAMILY MEDICINE

## 2025-03-21 RX ORDER — CELECOXIB 100 MG/1
100 CAPSULE ORAL 2 TIMES DAILY PRN
Qty: 60 CAPSULE | Refills: 1 | Status: SHIPPED | OUTPATIENT
Start: 2025-03-21

## 2025-03-21 RX ORDER — PANTOPRAZOLE SODIUM 20 MG/1
20 TABLET, DELAYED RELEASE ORAL EVERY MORNING
Qty: 90 TABLET | Refills: 3 | Status: SHIPPED | OUTPATIENT
Start: 2025-03-21 | End: 2026-03-21

## 2025-03-21 NOTE — PROGRESS NOTES
"EXECUTIVE HEALTH ENCOUNTER  3/21/25      REASON FOR VISIT  EXECUTIVE HEALTH    PCP (Primary Care Provider)  I am Fco's PCP.    HISTORY  Fco is here for his Executive Health Exam. He reports he is doing well. He remains physically active and maintains a healthy diet and lifestyle. He's recovering from adhesive capsulitis of right shoulder. He requests testosterone lab to evaluate low libido. No other complaint or concern reported.    Except as noted herein, ROS is otherwise negative.    ASSESSMENT/PLAN  1. Preventative health care    2. Gastroesophageal reflux disease without esophagitis  Overview:  EGD WNL 3/9/2022    Orders:  -     pantoprazole (PROTONIX) 20 MG tablet; Take 1 tablet (20 mg total) by mouth every morning.  Dispense: 90 tablet; Refill: 3    3. Low libido  -     Testosterone; Future; Expected date: 03/21/2025    4. Adhesive capsulitis of right shoulder  -     celecoxib (CELEBREX) 100 MG capsule; Take 1 capsule (100 mg total) by mouth 2 (two) times daily as needed (for musculoskeletal pain).  Dispense: 60 capsule; Refill: 1        PHYSICAL EXAM  Vitals:    03/21/25 0936   BP: 122/78   Pulse: 65   Temp: 98.4 °F (36.9 °C)   Weight: 71.7 kg (158 lb)   Height: 5' 8" (1.727 m)   Physical Exam  Vitals reviewed.   Constitutional:       General: He is not in acute distress.     Appearance: Normal appearance. He is not ill-appearing, toxic-appearing or diaphoretic.   HENT:      Head: Normocephalic and atraumatic.      Right Ear: Tympanic membrane, ear canal and external ear normal.      Left Ear: Tympanic membrane, ear canal and external ear normal.   Eyes:      General: No scleral icterus.     Conjunctiva/sclera: Conjunctivae normal.   Neck:      Vascular: No carotid bruit.   Cardiovascular:      Rate and Rhythm: Normal rate and regular rhythm.      Heart sounds: Normal heart sounds.   Pulmonary:      Effort: Pulmonary effort is normal.      Breath sounds: Normal breath sounds.   Abdominal:      General: " Bowel sounds are normal. There is no distension.      Palpations: Abdomen is soft. There is no mass.      Tenderness: There is no abdominal tenderness.   Musculoskeletal:         General: No swelling or tenderness. Normal range of motion.      Cervical back: No muscular tenderness.   Lymphadenopathy:      Cervical: No cervical adenopathy.   Skin:     General: Skin is warm and dry.      Coloration: Skin is not jaundiced.   Neurological:      General: No focal deficit present.      Mental Status: He is alert and oriented to person, place, and time.      Cranial Nerves: No cranial nerve deficit.      Gait: Gait normal.   Psychiatric:         Mood and Affect: Mood normal.         Behavior: Behavior normal.         Judgment: Judgment normal.         MEDICATIONS  Fco has a current medication list which includes the following prescription(s): celecoxib and pantoprazole.    HEALTH MAINTENANCE AND SCREENINGS - UP TO DATE  Health Maintenance Topics with due status: Not Due       Topic Last Completion Date    TETANUS VACCINE 01/01/2017    Lipid Panel 03/21/2025    RSV Vaccine (Age 60+ and Pregnant patients) Not Due     HEALTH MAINTENANCE AND SCREENINGS - DUE OR DUE SOON  Health Maintenance Due   Topic Date Due    Influenza Vaccine (1) 09/01/2024    COVID-19 Vaccine (3 - 2024-25 season) 09/01/2024     FOLLOW-UP  Fco is to follow up with me for any health problems or concerns, any abnormal test results, and any age-appropriate health maintenance interventions and screenings that may be due.    PROBLEM LIST  Fco has Frequent PVCs; Family history of Cotto's esophagus; Gastroesophageal reflux disease without esophagitis; Urinary hesitancy; Labral tear of shoulder, left, sequela; Traumatic arthritis of left foot; Multiple nevi; Mild bilateral hearing loss; Chronic neck pain; Low libido; Subcutaneous cyst left anterior thigh; Anxiety; and Adhesive capsulitis of right shoulder on their problem list.    PAST MEDICAL HISTORY  Fco  "has a past medical history of Chronic neck pain, Gastroesophageal reflux disease without esophagitis, and GERD (gastroesophageal reflux disease).    SURGICAL HISTORY  Fco has a past surgical history that includes Appendectomy; Tonsillectomy; and Esophagogastroduodenoscopy (N/A, 3/9/2022).    FAMILY HISTORY  Fco family history includes Cancer in his paternal grandmother; Heart attack in his father and maternal grandfather; Heart disease in his mother; Hypertrophic cardiomyopathy in his mother; Liver disease in his mother.     ALLERGIES  Foc reports he has no known allergies.    SOCIAL HISTORY  Fco  reports that he has never smoked. He has never used smokeless tobacco. He reports that he does not drink alcohol and does not use drugs.     This note was generated with the assistance of ambient listening technology. Verbal consent was obtained by the patient and accompanying visitor(s) for the recording of patient appointment to facilitate this note. I attest to having reviewed and edited the generated note for accuracy, though some syntax or spelling errors may persist. Please contact the author of this note for any clarification.    Documentation entered by me for this encounter may have been done in part using speech-recognition technology. Although I have made an effort to ensure accuracy, "sound like" errors may exist and should be interpreted in context.  "

## 2025-03-21 NOTE — PROGRESS NOTES
Executive Health Screening    Fco Millan was seen 03/21/2025 for an executive health hearing screen.      Otoscopy was unremarkable in both ears. Results revealed a mild hearing loss 3000 & 8000 Hz for the right ear, and  mild hearing loss 1000 & 3000 Hz for the left ear.     Patient was counseled on the above findings.    Recommendations:   Consistent use of hearing protective devices when around loud noise.   Annual hearing screenings.

## 2025-03-21 NOTE — LETTER
Dear Fco,     It was truly a pleasure to see you recently for your Executive Health Exam. Your dedication as a  with the [facility] is commendable, and I am grateful for your service to our community. I am writing to provide you with a summary of your exam and test results.    During your visit, we addressed gastroesophageal reflux disease, low libido, and adhesive capsulitis of the right shoulder.    Your vital signs during the exam were very good. Blood pressure was 122/78, which falls within the healthy range, normally considered to be below 120/80. Your body mass index (BMI) of 24.02 categorizes you as having a healthy weight, with the normal BMI range being 18.5 to 24.9. Over the past visits, your blood pressure and BMI have shown consistency in the healthy range. Your weight has gradually increased, and I'd encourage maintaining a balanced lifestyle to ensure this trend stays positive.    Regarding your social habits, you have a commendable history of never using tobacco and minimal alcohol consumption, both of which contribute positively to your overall health. Keep up these healthy choices!    Reviewing your social determinants of health, it is great to see that you face low financial strain, no food insecurity, and you maintain good health literacy. While you feel rather stressed, acknowledging this is the first step toward finding effective coping mechanisms to manage and reduce stress.    Your current medications include Celecoxib and Pantoprazole.    Now let's review your test results:    - Complete Blood Count (CBC): This test monitors your overall health and detects a variety of disorders such as anemia and infection. Your results are largely within normal limits, though the RBC count was slightly low at 4.52 M/uL. This is not clinically concerning at this time, but we'll continue to monitor it in future check-ups.    - Comprehensive Metabolic Panel (CMP): This  panel checks kidney function, blood sugar, and electrolyte levels. All your results fall within normal ranges, indicating healthy metabolic function and kidney health.    - Lipid Panel: This test assesses cholesterol levels as an indicator of heart health. Your cholesterol, triglycerides, HDL, and LDL levels are within normal limits, which is reassuring for your cardiovascular health.    - Urinalysis: All components were normal, indicating no current issues with kidney function or urinary tract health.    - Hemoglobin A1C: This measures your average blood glucose levels over the past three months. Your A1C is at 5.0%, indicating excellent blood sugar control.    - TSH (Thyroid-Stimulating Hormone): This assesses thyroid function. Your result is normal, indicating a healthy thyroid.    - PSA (Prostate-Specific Antigen): This is a screening tool for prostate health. Your PSA level is within normal range, providing reassurance about prostate health.    - Testosterone, Total: This measures your testosterone levels, which are normal and reassuring.    - Blood Lead Level: Your result is below detectable levels, indicating no exposure concerns.    - EKG (Electrocardiogram): This test evaluates heart function and rhythm. The results were normal, with no significant changes from previous EKGs, indicating a healthy heart rhythm.    - Spirometry: This measures lung function. The results were also normal, suggesting that your respiratory health is within the expected range.    I reviewed your historical lab data, and your cholesterol levels have decreased since the last test, which is a positive trend. Keep focusing on heart-healthy habits like a balanced diet and regular exercise.    Health maintenance is vital, and it's great to see you're up to date on most all your vaccinations and screenings.    As your family physician, I am committed to ensuring you receive the best care and look forward to continuing our healthcare  journey together. Thank you for allowing me and my team to care for you, and for placing your trust in [facility] for your healthcare needs. Please feel free to reach out if any new concerns arise or if you have any questions about your exam results. I am here to support you in every way possible.    Warmest regards,     OLGA Zamudio MD

## 2025-05-06 ENCOUNTER — OFFICE VISIT (OUTPATIENT)
Dept: OPHTHALMOLOGY | Facility: CLINIC | Age: 40
End: 2025-05-06
Payer: OTHER GOVERNMENT

## 2025-05-06 ENCOUNTER — PATIENT MESSAGE (OUTPATIENT)
Dept: OPHTHALMOLOGY | Facility: CLINIC | Age: 40
End: 2025-05-06

## 2025-05-06 DIAGNOSIS — H52.03 LATENT HYPEROPIA OF BOTH EYES: Primary | ICD-10-CM

## 2025-05-06 PROCEDURE — 99212 OFFICE O/P EST SF 10 MIN: CPT | Mod: PBBFAC,PO | Performed by: OPTOMETRIST

## 2025-05-06 PROCEDURE — 99999 PR PBB SHADOW E&M-EST. PATIENT-LVL II: CPT | Mod: PBBFAC,,, | Performed by: OPTOMETRIST

## 2025-05-06 PROCEDURE — 92310 CONTACT LENS FITTING OU: CPT | Mod: CSM,,, | Performed by: OPTOMETRIST

## 2025-05-06 NOTE — PROGRESS NOTES
HPI     Annual Exam            Comments: Patient reports for CTL fitting.           Comments    Vision changes since last eye exam?: Patient is using OTC readers more   +1.75      Any eye pain today: None    Other ocular symptoms: None    Interested in contact lens fitting today? Yes               Last edited by Kobi Campo, OD on 5/6/2025  4:03 PM.            Assessment /Plan     For exam results, see Encounter Report.    Latent hyperopia of both eyes      Contact Lens Final Rx       Final Contact Lens Rx         Brand Base Curve Diameter Sphere Cylinder    Right Acuvue Elaine 8.4 14.0 +1.75 Sphere    Left Acuvue Elaine 8.4 14.0 +1.75 Sphere      Expiration Date: 5/6/2026    Replacement: Monthly    Solutions: OptiFree PureMoist    Wearing Schedule: Daily Wear                  Contact lens trials fitted in office today. Contact lens hygiene reviewed. Patient able to insert the lenses themselves with minimal difficulty. Patient ok to finalize Contact lens after 1 week of wear. RTC if still having difficulty with CTL trial after 1 week.

## 2025-05-08 ENCOUNTER — PATIENT MESSAGE (OUTPATIENT)
Dept: RESEARCH | Facility: HOSPITAL | Age: 40
End: 2025-05-08
Payer: OTHER GOVERNMENT

## 2025-05-15 ENCOUNTER — OFFICE VISIT (OUTPATIENT)
Dept: OPHTHALMOLOGY | Facility: CLINIC | Age: 40
End: 2025-05-15
Payer: OTHER GOVERNMENT

## 2025-05-15 DIAGNOSIS — H52.03 LATENT HYPEROPIA OF BOTH EYES: ICD-10-CM

## 2025-05-15 DIAGNOSIS — Z01.00 ENCOUNTER FOR EYE EXAM: Primary | ICD-10-CM

## 2025-05-15 PROCEDURE — 99999 PR PBB SHADOW E&M-EST. PATIENT-LVL II: CPT | Mod: PBBFAC,,, | Performed by: OPTOMETRIST

## 2025-05-15 PROCEDURE — 99212 OFFICE O/P EST SF 10 MIN: CPT | Mod: PBBFAC,PO | Performed by: OPTOMETRIST

## 2025-05-15 NOTE — PROGRESS NOTES
HPI     Annual Exam            Comments: Pt reports for routine eye exam.   Pt states his VA has been stable and he has no complaints about his new   CTL mrx for vision. Pt states he is still having some problems with taking   it on and off, and he dropped his CTL on the floor and threw it away. Pt   is using eyeglasses currently.   Pt denies any eye pain and irritation.  Pt denies any new ocular disturbances.           Last edited by Laurence Forbes on 5/15/2025 10:54 AM.            Assessment /Plan     For exam results, see Encounter Report.    1. Encounter for eye exam  Normal fundus exam.  Mrx given, wear full time.    2. Latent hyperopia of both eyes  Continue wearing current Rx.   Contact Lens Final Rx       Final Contact Lens Rx         Brand Base Curve Diameter Sphere Cylinder    Right Acuvue Elaine 8.4 14.0 +1.75 Sphere    Left Acuvue Elaine 8.4 14.0 +1.75 Sphere      Expiration Date: 5/15/2026    Replacement: Monthly    Solutions: OptiFree PureMoist    Wearing Schedule: Daily Wear                  Contact lens trials fitted in office today. Contact lens hygiene reviewed. Patient able to insert the lenses themselves with minimal difficulty. Patient ok to finalize Contact lens after 1 week of wear. RTC if still having difficulty with CTL trial after 1 week.       RTC 1 yr for dilated eye exam or sooner if any changes to vision.   Discussed above and answered questions.

## 2025-06-18 ENCOUNTER — PATIENT MESSAGE (OUTPATIENT)
Dept: OPTOMETRY | Facility: CLINIC | Age: 40
End: 2025-06-18
Payer: OTHER GOVERNMENT

## 2025-06-18 ENCOUNTER — PATIENT MESSAGE (OUTPATIENT)
Dept: SPORTS MEDICINE | Facility: CLINIC | Age: 40
End: 2025-06-18
Payer: OTHER GOVERNMENT

## 2025-06-18 DIAGNOSIS — M25.562 LEFT KNEE PAIN, UNSPECIFIED CHRONICITY: Primary | ICD-10-CM

## 2025-06-18 NOTE — PROGRESS NOTES
Patient ID: Fco Millan  YOB: 1985  MRN: [id number]    Referred By: established patient    Occupation: paramedic       History of Present Illness: Fco Millan is a 40 y.o. male who presents today with No chief complaint on file.     History of Present Illness            Previous HPI:  He is an established patient previously treated for right shoulder pain in January 2025.    Past Medical History:   Past Medical History:   Diagnosis Date    Chronic neck pain 4/24/2023    Gastroesophageal reflux disease without esophagitis 3/11/2021    GERD (gastroesophageal reflux disease)      Past Surgical History:   Procedure Laterality Date    APPENDECTOMY      ESOPHAGOGASTRODUODENOSCOPY N/A 3/9/2022    Procedure: ESOPHAGOGASTRODUODENOSCOPY (EGD);  Surgeon: Sarahi West MD;  Location: [facility];  Service: Endoscopy;  Laterality: N/A;    TONSILLECTOMY       Family History   Problem Relation Name Age of Onset    Liver disease Mother      Heart disease Mother      Hypertrophic cardiomyopathy Mother      Heart attack Father      Cancer Paternal Grandmother      Heart attack Maternal Grandfather       Social History[1]  Medication List with Changes/Refills   Current Medications    CELECOXIB (CELEBREX) 100 MG CAPSULE    Take 1 capsule (100 mg total) by mouth 2 (two) times daily as needed (for musculoskeletal pain).    PANTOPRAZOLE (PROTONIX) 20 MG TABLET    Take 1 tablet (20 mg total) by mouth every morning.     Review of patient's allergies indicates:  No Known Allergies    Physical Exam:   There is no height or weight on file to calculate BMI.    Detailed MSK exam:   Ortho/SPM Exam     Imaging:  MRI Shoulder Without Contrast Right  Narrative: EXAM: MRI SHOULDER WITHOUT CONTRAST RIGHT    CLINICAL HISTORY:  Right shoulder pain and limited range of motion.    COMPARISON:  Right shoulder x-ray on 10/17/2024    TECHNIQUE:  Multiplanar, multisequence MR imaging was performed through the right  shoulder without intravenous or intra-articular gadolinium contrast.    FINDINGS:    The acromioclavicular joint is within normal limits. There is a type 1  acromion.    Mild rotator cuff tendinosis.  No full-thickness cuff tear.    The biceps tendon is intact.    The biceps labral complex is intact.   No labral tears.    Thickening and high intrasubstance signal throughout the inferior glenohumeral ligament.  Small joint effusion.  Marked medial humeral head chondral thinning.  Impression: Rotator cuff tendinosis.    Adhesive capsulitis.    Finalized on: 12/19/2024 9:00 PM By:  MIKEL Godoy MD, MD  [facility]# [id number]      2024-12-19 21:02:[id number]    [facility]    ***    Relevant imaging results were reviewed and interpreted by me and per my read: ***    This was discussed with the patient and / or family today.     There are no Patient Instructions on file for this visit.    A copy of today's visit note has been sent to the referring provider.           Alec Gibbons MD  Primary [facility]    Disclaimer: This note was prepared using a voice recognition system and is likely to have sound alike errors within the text.     This note was generated with the assistance of ambient listening technology. Verbal consent was obtained by the patient and accompanying visitor(s) for the recording of patient appointment to facilitate this note. I attest to having reviewed and edited the generated note for accuracy, though some syntax or spelling errors may persist. Please contact the author of this note for any clarification.           [1]   Social History  Socioeconomic History    Marital status:    Tobacco Use    Smoking status: Never    Smokeless tobacco: Never   Substance and Sexual Activity    Alcohol use: No     Alcohol/week: 0.0 standard drinks of alcohol    Drug use: No    Sexual activity: Yes     Partners: Female     Social Drivers of Health     Financial Resource Strain: Low Risk  (9/3/2024)    Overall Financial  Resource Strain (CARDIA)     Difficulty of Paying Living Expenses: Not hard at all   Food Insecurity: No Food Insecurity (9/3/2024)    Hunger Vital Sign     Worried About Running Out of Food in the Last Year: Never true     Ran Out of Food in the Last Year: Never true   Physical Activity: Insufficiently Active (9/3/2024)    Exercise Vital Sign     Days of Exercise per Week: 3 days     Minutes of Exercise per Session: 40 min   Stress: Stress Concern Present (9/3/2024)    [employer] [place] [employer] - Occupational Stress Questionnaire     Feeling of Stress : Rather much   Housing Stability: Unknown (9/3/2024)    Housing Stability Vital Sign     Unable to Pay for Housing in the Last Year: No

## 2025-06-19 ENCOUNTER — OFFICE VISIT (OUTPATIENT)
Dept: PODIATRY | Facility: CLINIC | Age: 40
End: 2025-06-19
Payer: OTHER GOVERNMENT

## 2025-06-19 ENCOUNTER — HOSPITAL ENCOUNTER (OUTPATIENT)
Dept: RADIOLOGY | Facility: HOSPITAL | Age: 40
Discharge: HOME | End: 2025-06-19
Attending: PODIATRIST
Payer: OTHER GOVERNMENT

## 2025-06-19 VITALS — WEIGHT: 158.06 LBS | BODY MASS INDEX: 23.95 KG/M2 | HEIGHT: 68 IN

## 2025-06-19 DIAGNOSIS — M79.672 LEFT FOOT PAIN: ICD-10-CM

## 2025-06-19 DIAGNOSIS — M77.8 ENTHESOPATHY OF LEFT FOOT: Primary | ICD-10-CM

## 2025-06-19 DIAGNOSIS — M76.72 PERONEAL TENDONITIS, LEFT: ICD-10-CM

## 2025-06-19 PROCEDURE — 99999 PR PBB SHADOW E&M-EST. PATIENT-LVL III: CPT | Mod: PBBFAC,,, | Performed by: PODIATRIST

## 2025-06-19 PROCEDURE — 73630 X-RAY EXAM OF FOOT: CPT | Mod: 26,LT,, | Performed by: RADIOLOGY

## 2025-06-19 PROCEDURE — 99213 OFFICE O/P EST LOW 20 MIN: CPT | Mod: PBBFAC,25 | Performed by: PODIATRIST

## 2025-06-19 PROCEDURE — 99204 OFFICE O/P NEW MOD 45 MIN: CPT | Mod: S$PBB,,, | Performed by: PODIATRIST

## 2025-06-19 PROCEDURE — 73630 X-RAY EXAM OF FOOT: CPT | Mod: TC,LT

## 2025-06-19 RX ORDER — CELECOXIB 200 MG/1
200 CAPSULE ORAL DAILY
Qty: 30 CAPSULE | Refills: 0 | Status: SHIPPED | OUTPATIENT
Start: 2025-06-19 | End: 2025-07-19

## 2025-06-19 NOTE — PROGRESS NOTES
Subjective:       Patient ID: Fco Millan is a 40 y.o. male.    Chief Complaint: Foot Pain (Left foot pain, rate pain 6/10, pt is wearing tennis shoes and socks, nondiabetic )    Foot Pain  Pertinent negatives include no chest pain, chills, coughing, fatigue, fever, headaches, nausea, neck pain or vomiting.     Fco Millan presents to the office today with complaints of moderate pains at the lateral aspect of the left foot.  Patient rates the pain at approximately 6/10.  States antalgic gait pattern.  States moderate edema. States weakness with gait. The patient states the pain does radiate up the outside of the ankle/lower leg at times. States recent trauma. States no Tylenol and/or NSAID type medications for alleviation. States recent prior xray evaluation.  The pains been present now for the past several months. EFRAIN Zamudio MD is the primary care provider.  Prolonged walking and standing does exacerbate the symptoms.    Review of patient's allergies indicates:  No Known Allergies    Past Medical History:   Diagnosis Date    Chronic neck pain 4/24/2023    Gastroesophageal reflux disease without esophagitis 3/11/2021    GERD (gastroesophageal reflux disease)        Family History   Problem Relation Name Age of Onset    Liver disease Mother      Heart disease Mother      Hypertrophic cardiomyopathy Mother      Heart attack Father      Cancer Paternal Grandmother      Heart attack Maternal Grandfather         Social History[1]    Past Surgical History:   Procedure Laterality Date    APPENDECTOMY      ESOPHAGOGASTRODUODENOSCOPY N/A 3/9/2022    Procedure: ESOPHAGOGASTRODUODENOSCOPY (EGD);  Surgeon: Sarahi West MD;  Location: [facility];  Service: Endoscopy;  Laterality: N/A;    TONSILLECTOMY         Review of Systems   Constitutional:  Negative for chills, fatigue and fever.   HENT:  Negative for hearing loss.    Eyes:  Negative for photophobia and visual disturbance.   Respiratory:   "Negative for cough, chest tightness, shortness of breath and wheezing.    Cardiovascular:  Negative for chest pain and palpitations.   Gastrointestinal:  Negative for constipation, diarrhea, nausea and vomiting.   Endocrine: Negative for cold intolerance and heat intolerance.   Genitourinary:  Negative for flank pain.   Musculoskeletal:  Positive for gait problem. Negative for neck pain and neck stiffness.   Neurological:  Negative for light-headedness and headaches.   Psychiatric/Behavioral:  Negative for sleep disturbance.           Objective:   Ht 5' 8" (1.727 m)   Wt 71.7 kg (158 lb 1.1 oz)   BMI 24.03 kg/m²     Physical Exam    LOWER EXTREMITY PHYSICAL EXAMINATION  ORTHOPEDIC:  Moderate discomfort along the course of the left peroneal tendon. There is mild edema is noted along the course. There is no retromalleolar edema noted. No subluxing peroneals are noted. Mild discomfort to palpation lateral malleolus. There is moderate pain to palpation of the 5th metatarsal base. There is mild discomfort to palpation of 5th metatarsal proximal metaphysis and diaphysis.  There is no discomfort to the sinus tarsi. There is no tenderness to palpation of the ATFL and none to the CFL and none to the PTFL. Gait pattern is antalgic. MMT with isolation of peroneal tendon, is weak as compared to contralateral. There is no discomfort to palpation lateral aspect of the foot at the calcaneocuboid joint.    Assessment:     1. Enthesopathy of left foot    2. Left foot pain    3. Peroneal tendonitis, left        Plan:     Enthesopathy of left foot  -     celecoxib (CELEBREX) 200 MG capsule; Take 1 capsule (200 mg total) by mouth once daily.  Dispense: 30 capsule; Refill: 0    Left foot pain  -     X-Ray Foot Complete Left; Future; Expected date: 06/19/2025  -     celecoxib (CELEBREX) 200 MG capsule; Take 1 capsule (200 mg total) by mouth once daily.  Dispense: 30 capsule; Refill: 0    Peroneal tendonitis, left  -     celecoxib " (CELEBREX) 200 MG capsule; Take 1 capsule (200 mg total) by mouth once daily.  Dispense: 30 capsule; Refill: 0      Thorough discussion is had with the patient today, concerning the diagnosis, its etiology, and the treatment algorithm at present.     XRAYS are reviewed in detail with the patient. All questions and concerns regarding findings and its/their implications are outlined and discussed.    Start PO Celebrex QD for 10 or so days, then as needed.    If no symptomatic improvement, follow up for CSI.    Patient should ambulate with proper and supportive shoe gear.  These are shoes with firm and robust arch support; medial counter.  Shoes which only bend at the metatarsophalangeal joint and which are rigid in the midfoot and hindfoot. Running type or cross training type shoes gear which are designed for pronation control is best.     Stretching exercises are discussed, taught, and are demonstrated to the patient this afternoon due to concomitant diagnosis of equinus contracture.         Future Appointments   Date Time Provider Department Center   6/20/2025  7:45 AM [facility] XR1 [facility] XRAY [place]   6/20/2025  8:00 AM Alec Gibbons MD [facility] SPOMED [place]   8/21/2025 10:40 AM Chelsea Bill PA-C [facility] DERM [place]   8/28/2025  9:00 AM Chelsea Bill PA-C [facility] DERM [place]            [1]   Social History  Socioeconomic History    Marital status:    Tobacco Use    Smoking status: Never    Smokeless tobacco: Never   Substance and Sexual Activity    Alcohol use: No     Alcohol/week: 0.0 standard drinks of alcohol    Drug use: No    Sexual activity: Yes     Partners: Female     Social Drivers of Health     Financial Resource Strain: Low Risk  (9/3/2024)    Overall Financial Resource Strain (CARDIA)     Difficulty of Paying Living Expenses: Not hard at all   Food Insecurity: No Food Insecurity (9/3/2024)    Hunger Vital Sign     Worried About Running Out of Food in the Last Year:  Never true     Ran Out of Food in the Last Year: Never true   Physical Activity: Insufficiently Active (9/3/2024)    Exercise Vital Sign     Days of Exercise per Week: 3 days     Minutes of Exercise per Session: 40 min   Stress: Stress Concern Present (9/3/2024)    [employer] [place] [employer] - Occupational Stress Questionnaire     Feeling of Stress : Rather much   Housing Stability: Unknown (9/3/2024)    Housing Stability Vital Sign     Unable to Pay for Housing in the Last Year: No

## 2025-06-20 ENCOUNTER — OFFICE VISIT (OUTPATIENT)
Dept: SPORTS MEDICINE | Facility: CLINIC | Age: 40
End: 2025-06-20
Payer: OTHER GOVERNMENT

## 2025-06-20 ENCOUNTER — HOSPITAL ENCOUNTER (OUTPATIENT)
Dept: RADIOLOGY | Facility: HOSPITAL | Age: 40
Discharge: HOME | End: 2025-06-20
Attending: STUDENT IN AN ORGANIZED HEALTH CARE EDUCATION/TRAINING PROGRAM
Payer: OTHER GOVERNMENT

## 2025-06-20 DIAGNOSIS — M25.562 LEFT KNEE PAIN, UNSPECIFIED CHRONICITY: ICD-10-CM

## 2025-06-20 DIAGNOSIS — M94.262 CHONDROMALACIA OF LEFT KNEE: ICD-10-CM

## 2025-06-20 DIAGNOSIS — G89.29 CHRONIC PAIN OF LEFT KNEE: ICD-10-CM

## 2025-06-20 DIAGNOSIS — M25.562 CHRONIC PAIN OF LEFT KNEE: ICD-10-CM

## 2025-06-20 DIAGNOSIS — M17.12 PRIMARY OSTEOARTHRITIS OF LEFT KNEE: Primary | ICD-10-CM

## 2025-06-20 PROCEDURE — 73564 X-RAY EXAM KNEE 4 OR MORE: CPT | Mod: 26,LT,, | Performed by: STUDENT IN AN ORGANIZED HEALTH CARE EDUCATION/TRAINING PROGRAM

## 2025-06-20 PROCEDURE — 73564 X-RAY EXAM KNEE 4 OR MORE: CPT | Mod: TC,FY,PO,LT

## 2025-06-20 PROCEDURE — 73562 X-RAY EXAM OF KNEE 3: CPT | Mod: 26,RT,, | Performed by: STUDENT IN AN ORGANIZED HEALTH CARE EDUCATION/TRAINING PROGRAM

## 2025-06-20 PROCEDURE — 99213 OFFICE O/P EST LOW 20 MIN: CPT | Mod: PBBFAC,25,PO | Performed by: STUDENT IN AN ORGANIZED HEALTH CARE EDUCATION/TRAINING PROGRAM

## 2025-06-20 PROCEDURE — 99999 PR PBB SHADOW E&M-EST. PATIENT-LVL III: CPT | Mod: PBBFAC,,, | Performed by: STUDENT IN AN ORGANIZED HEALTH CARE EDUCATION/TRAINING PROGRAM

## 2025-06-20 NOTE — PROGRESS NOTES
Patient ID: Fco Millan  YOB: 1985  MRN: [id number]    Referred By: established patient    Occupation: paramedic       History of Present Illness: Fco Millan is a 40 y.o. male who presents today with Pain of the Left Knee      and peramedic, 11+ year potential meniscus , states the past 6 weeks his left knee has progressively gotten worse, specifically in the back of knee. States that it feeling tight and painful especially when standing up. He is active at least 4 times a week,     History of Present Illness    HPI:  Fco presents for follow-up regarding his shoulder and to discuss new knee concerns. His shoulder is nearly 100 percent improved after the last injection, which was more effective than the first. He has been engaging in PT exercises, including cross cable exercises and sword movements, which have significantly improved his condition. He has resumed push-ups, though he was initially hesitant.    He reports longstanding knee problems, including possible arthritis. He describes new pain in the back of the knee, extending into the center, particularly noticeable when getting up after driving, requiring several steps before it improves. He mentions a possible meniscus tear diagnosed in 2009 or 2011, but no intervention was done at that time. He reports chondromalacia in both knees, primarily affecting one knee. He experiences popping sensations, particularly behind the kneecap, with occasional clicking and locking sensations, though these are not consistent. He has noticed increased stiffness and back of knee pain.    He has been taking Motrin occasionally for pain relief. He resumed running last month, typically running a mile or two for warm-up before workouts. He denies calf pain during running and states that he does not feel any pain while moving.    He denies any major limitations in day-to-day activities due to shoulder pain and any formal diagnosis of  a current meniscus tear. Shoulder injection: Recently, provided significant benefit compared to a previous injection  PT exercises: Ongoing, including cross cable exercises and sword movements, have helped loosen up the shoulder  Push-ups: Recently resumed after previously avoiding them due to shoulder issues    MEDICATIONS:  - Motrin (ibuprofen): Occasionally for knee pain, minimal relief    WORK STATUS:  - Drives to [place] once a month, possibly work-related       Previous HPI:  He is an established patient previously treated for right shoulder pain in January 2025.    Past Medical History:   Past Medical History:   Diagnosis Date    Chronic neck pain 4/24/2023    Gastroesophageal reflux disease without esophagitis 3/11/2021    GERD (gastroesophageal reflux disease)      Past Surgical History:   Procedure Laterality Date    APPENDECTOMY      ESOPHAGOGASTRODUODENOSCOPY N/A 3/9/2022    Procedure: ESOPHAGOGASTRODUODENOSCOPY (EGD);  Surgeon: Sarahi West MD;  Location: [facility];  Service: Endoscopy;  Laterality: N/A;    TONSILLECTOMY       Family History   Problem Relation Name Age of Onset    Liver disease Mother      Heart disease Mother      Hypertrophic cardiomyopathy Mother      Heart attack Father      Cancer Paternal Grandmother      Heart attack Maternal Grandfather       Social History[1]  Medication List with Changes/Refills   Current Medications    CELECOXIB (CELEBREX) 200 MG CAPSULE    Take 1 capsule (200 mg total) by mouth once daily.    PANTOPRAZOLE (PROTONIX) 20 MG TABLET    Take 1 tablet (20 mg total) by mouth every morning.     Review of patient's allergies indicates:  No Known Allergies    Physical Exam:   There is no height or weight on file to calculate BMI.    Detailed MSK exam:   Ortho/SPM Exam     Left Knee:  Inspection:  Normal  Palpation tenderness: Medial joint line, popliteal fossa  Range of motion: 0 deg extension - 130 deg flexion  Strength:  5/5 Extension    5/5 Flexion  Stability:  Stable ACL/Lachman      Stable Posterior Drawer      Stable Varus Stress  Patella Exam: Negative J-sign   Negative Patellar apprehension   Positive Patellar crepitus   Meniscus: Negative medial/lateral Raman's    Negative medial/lateral Thessaly  N/V Exam:  Tibial:    Normal sensory (plantar foot)  Normal motor (FHL)    Sup Peroneal:  Normal sensory (dorsal foot)  Normal motor (Peroneals)            Deep Peroneal:  Normal sensory (1st web space) Normal motor (EHL)    Sural:   Normal sensory (lateral foot)   Saphenous:   Normal sensory (medial lower leg)   Normal pedal pulses, warm and well perfused with capillary refill < 2 sec        Imaging:  XR left knee - 06/20/2025    Relevant imaging results were reviewed and interpreted by me and per my read:  Mild medial compartment narrowing of both knees with the associated subchondral sclerosis.  Well-maintained joint spaces overall.  Normal alignment.  No significant osteophytosis.  No fractures or other acute abnormalities.    This was discussed with the patient and / or family today.     Patient Instructions   Assessment:  Fco Millan is a 40 y.o. male with a chief complaint of Pain of the Left Knee    Encounter Diagnoses   Name Primary?    Primary osteoarthritis of left knee Yes    Chondromalacia of left knee     Chronic pain of left knee       Plan:  XR reviewed - mild medial narrowing of the left knee, consistent with early osteoarthritis   Labs reviewed - A1c 5.0%, Cr 1.0, GFR > 60, LFTs WNL   History and clinical exam is consistent with chronic left knee pain due to early osteoarthritis, chondromalacia, and resultant knee stiffness.    Diagnosis, treatment options, prognosis discussed today.    Recommend conservative management.    Patient likes to avoid medications, if possible, but can use over-the-counter NSAIDs such as Motrin, Aleve, as needed.    Would recommend over-the-counter turmeric supplement as well to help with inflammation.    Maintain  activity level to decrease stiffness   Would recommend the use of the compression knee sleeve while exercising/active.  No indication for corticosteroid injection at this time, but can consider if pain is not improving, or it is becoming worse, more limiting/debilitating.    Follow-up:  We will the if this issues persists or gets worse .    Thank you for choosing [facility] and Dr. Alec Gibbons for your orthopedic & sports medicine care. It is our goal to provide you with exceptional care that will help keep you healthy, active, and get you back in the game.    Please do not hesitate to reach out to us via email, phone, or MyChart with any questions, concerns, or feedback.    If you are experiencing pain/discomfort, or have questions after 5pm and would like to be connected to the [facility] on-call team, please call this number and specify which Sports Medicine provider is treating you: [phone]  During business hours, before 5 PM, if you have any questions or concerns, please call this number and as to speak with a member of Dr Gibbons's team: [phone]      A copy of today's visit note has been sent to the referring provider.           Alec Gibbons MD  Primary Care Sports Medicine    Disclaimer: This note was prepared using a voice recognition system and is likely to have sound alike errors within the text.     This note was generated with the assistance of ambient listening technology. Verbal consent was obtained by the patient and accompanying visitor(s) for the recording of patient appointment to facilitate this note. I attest to having reviewed and edited the generated note for accuracy, though some syntax or spelling errors may persist. Please contact the author of this note for any clarification.             [1]   Social History  Socioeconomic History    Marital status:    Tobacco Use    Smoking status: Never    Smokeless tobacco:  Never   Substance and Sexual Activity    Alcohol use: No     Alcohol/week: 0.0 standard drinks of alcohol    Drug use: No    Sexual activity: Yes     Partners: Female     Social Drivers of Health     Financial Resource Strain: Low Risk  (9/3/2024)    Overall Financial Resource Strain (CARDIA)     Difficulty of Paying Living Expenses: Not hard at all   Food Insecurity: No Food Insecurity (9/3/2024)    Hunger Vital Sign     Worried About Running Out of Food in the Last Year: Never true     Ran Out of Food in the Last Year: Never true   Physical Activity: Insufficiently Active (9/3/2024)    Exercise Vital Sign     Days of Exercise per Week: 3 days     Minutes of Exercise per Session: 40 min   Stress: Stress Concern Present (9/3/2024)    [place] [employer] - Occupational Stress Questionnaire     Feeling of Stress : Rather much   Housing Stability: Unknown (9/3/2024)    Housing Stability Vital Sign     Unable to Pay for Housing in the Last Year: No

## 2025-06-20 NOTE — PATIENT INSTRUCTIONS
Assessment:  Fco Millan is a 40 y.o. male with a chief complaint of Pain of the Left Knee    Encounter Diagnoses   Name Primary?    Primary osteoarthritis of left knee Yes    Chondromalacia of left knee     Chronic pain of left knee       Plan:  XR reviewed - mild medial narrowing of the left knee, consistent with early osteoarthritis   Labs reviewed - A1c 5.0%, Cr 1.0, GFR > 60, LFTs WNL   History and clinical exam is consistent with chronic left knee pain due to early osteoarthritis, chondromalacia, and resultant knee stiffness.    Diagnosis, treatment options, prognosis discussed today.    Recommend conservative management.    Patient likes to avoid medications, if possible, but can use over-the-counter NSAIDs such as Motrin, Aleve, as needed.    Would recommend over-the-counter turmeric supplement as well to help with inflammation.    Maintain activity level to decrease stiffness   Would recommend the use of the compression knee sleeve while exercising/active.  No indication for corticosteroid injection at this time, but can consider if pain is not improving, or it is becoming worse, more limiting/debilitating.    Follow-up:  We will the if this issues persists or gets worse .    Thank you for choosing [facility] and Dr. Alec Gibbons for your orthopedic & sports medicine care. It is our goal to provide you with exceptional care that will help keep you healthy, active, and get you back in the game.    Please do not hesitate to reach out to us via email, phone, or MyChart with any questions, concerns, or feedback.    If you are experiencing pain/discomfort, or have questions after 5pm and would like to be connected to the [facility] on-call team, please call this number and specify which Sports Medicine provider is treating you: [phone]  During business hours, before 5 PM, if you have any questions or concerns, please call this number and  as to speak with a member of Dr Gibbons's team: [phone]

## 2025-08-21 ENCOUNTER — OFFICE VISIT (OUTPATIENT)
Dept: DERMATOLOGY | Facility: CLINIC | Age: 40
End: 2025-08-21
Payer: OTHER GOVERNMENT

## 2025-08-21 DIAGNOSIS — D22.9 MULTIPLE NEVI: Primary | ICD-10-CM

## 2025-08-21 DIAGNOSIS — R22.9 SUBCUTANEOUS NODULE: ICD-10-CM

## 2025-08-21 DIAGNOSIS — L82.1 SEBORRHEIC KERATOSIS: ICD-10-CM

## 2025-08-21 PROCEDURE — 99214 OFFICE O/P EST MOD 30 MIN: CPT | Mod: S$PBB,,, | Performed by: PHYSICIAN ASSISTANT

## 2025-08-21 PROCEDURE — G2211 COMPLEX E/M VISIT ADD ON: HCPCS | Mod: ,,, | Performed by: PHYSICIAN ASSISTANT

## 2025-08-21 PROCEDURE — 99213 OFFICE O/P EST LOW 20 MIN: CPT | Mod: PBBFAC | Performed by: PHYSICIAN ASSISTANT

## 2025-08-21 PROCEDURE — 99999 PR PBB SHADOW E&M-EST. PATIENT-LVL III: CPT | Mod: PBBFAC,,, | Performed by: PHYSICIAN ASSISTANT

## 2025-08-28 ENCOUNTER — HOSPITAL ENCOUNTER (OUTPATIENT)
Dept: RADIOLOGY | Facility: HOSPITAL | Age: 40
Discharge: HOME | End: 2025-08-28
Attending: PHYSICIAN ASSISTANT
Payer: OTHER GOVERNMENT

## 2025-08-28 DIAGNOSIS — R22.9 SUBCUTANEOUS NODULE: ICD-10-CM

## 2025-08-28 PROCEDURE — 76705 ECHO EXAM OF ABDOMEN: CPT | Mod: TC,PO

## 2025-08-28 PROCEDURE — 76705 ECHO EXAM OF ABDOMEN: CPT | Mod: 26,,, | Performed by: RADIOLOGY

## 2025-09-02 DIAGNOSIS — R22.9 SUBCUTANEOUS NODULE: Primary | ICD-10-CM
